# Patient Record
Sex: FEMALE
[De-identification: names, ages, dates, MRNs, and addresses within clinical notes are randomized per-mention and may not be internally consistent; named-entity substitution may affect disease eponyms.]

---

## 2017-11-12 ENCOUNTER — CHARTING TRANS (OUTPATIENT)
Dept: OTHER | Age: 37
End: 2017-11-12

## 2017-12-04 ENCOUNTER — HOSPITAL ENCOUNTER (OUTPATIENT)
Age: 37
Discharge: HOME | End: 2017-12-04
Attending: FAMILY MEDICINE
Payer: COMMERCIAL

## 2017-12-04 VITALS
HEART RATE: 88 BPM | RESPIRATION RATE: 22 BRPM | TEMPERATURE: 99 F | OXYGEN SATURATION: 98 % | SYSTOLIC BLOOD PRESSURE: 133 MMHG | DIASTOLIC BLOOD PRESSURE: 79 MMHG

## 2017-12-04 DIAGNOSIS — J98.01 ACUTE BRONCHOSPASM: Primary | ICD-10-CM

## 2017-12-04 PROCEDURE — 96374 THER/PROPH/DIAG INJ IV PUSH: CPT

## 2017-12-04 PROCEDURE — 99214 OFFICE O/P EST MOD 30 MIN: CPT

## 2017-12-04 PROCEDURE — 94640 AIRWAY INHALATION TREATMENT: CPT

## 2017-12-04 RX ORDER — METHYLPREDNISOLONE SODIUM SUCCINATE 125 MG/2ML
125 INJECTION, POWDER, LYOPHILIZED, FOR SOLUTION INTRAMUSCULAR; INTRAVENOUS ONCE
Status: COMPLETED | OUTPATIENT
Start: 2017-12-04 | End: 2017-12-04

## 2017-12-04 RX ORDER — IPRATROPIUM BROMIDE AND ALBUTEROL SULFATE 2.5; .5 MG/3ML; MG/3ML
3 SOLUTION RESPIRATORY (INHALATION) ONCE
Status: COMPLETED | OUTPATIENT
Start: 2017-12-04 | End: 2017-12-04

## 2017-12-04 RX ORDER — ALBUTEROL SULFATE 2.5 MG/3ML
2.5 SOLUTION RESPIRATORY (INHALATION) EVERY 4 HOURS PRN
Qty: 30 AMPULE | Refills: 0 | Status: SHIPPED | OUTPATIENT
Start: 2017-12-04 | End: 2018-01-03

## 2017-12-04 RX ORDER — ALBUTEROL SULFATE 90 UG/1
2 AEROSOL, METERED RESPIRATORY (INHALATION) EVERY 4 HOURS PRN
Qty: 1 INHALER | Refills: 0 | Status: SHIPPED | OUTPATIENT
Start: 2017-12-04 | End: 2018-01-03

## 2017-12-04 RX ORDER — PREDNISONE 10 MG/1
TABLET ORAL
Qty: 26 TABLET | Refills: 0 | Status: SHIPPED | OUTPATIENT
Start: 2017-12-05 | End: 2017-12-11

## 2017-12-04 NOTE — ED PROVIDER NOTES
Patient Seen in: [facility] In [facility] & [facility]    History   Patient presents with:  Dyspnea MILY SOB (respiratory)    Stated Complaint: wheezing / cough    HPI  70-year-old female coming in with complains of bronchospasm, wheezing, started last nig making good conversation, answering appropriately   SKIN: No pallor, no erythema, no cyanosis, warm and dry  Eyes: wnl, normal conjunctiva   HEAD: Normocephalic, atraumatic  EENT: OP - wnl, moist, Nares normal  NECK: FROM, supple  LUNGS: Expiratory wheeze 1451  ------------------------------------------------------------       MDM       Rx Prednisone taper to start tomorrow - you have already received Solumedrol here today   Rx Albuterol inhaler 2 puffs or Albuterol neb every 4-6 hours over the next 48 hour

## 2017-12-12 ENCOUNTER — OFFICE VISIT (OUTPATIENT)
Dept: ALLERGY | Facility: CLINIC | Age: 37
End: 2017-12-12

## 2017-12-12 ENCOUNTER — APPOINTMENT (OUTPATIENT)
Dept: LAB | Age: 37
End: 2017-12-12
Attending: ALLERGY & IMMUNOLOGY
Payer: COMMERCIAL

## 2017-12-12 ENCOUNTER — NURSE ONLY (OUTPATIENT)
Dept: ALLERGY | Facility: CLINIC | Age: 37
End: 2017-12-12

## 2017-12-12 ENCOUNTER — HOSPITAL ENCOUNTER (OUTPATIENT)
Dept: GENERAL RADIOLOGY | Age: 37
Discharge: HOME | End: 2017-12-12
Attending: ALLERGY & IMMUNOLOGY
Payer: COMMERCIAL

## 2017-12-12 VITALS
HEART RATE: 78 BPM | RESPIRATION RATE: 20 BRPM | BODY MASS INDEX: 31.54 KG/M2 | TEMPERATURE: 98 F | WEIGHT: 178 LBS | SYSTOLIC BLOOD PRESSURE: 120 MMHG | HEIGHT: 63 IN | OXYGEN SATURATION: 98 % | DIASTOLIC BLOOD PRESSURE: 82 MMHG

## 2017-12-12 DIAGNOSIS — Z91.018 FOOD ALLERGY: ICD-10-CM

## 2017-12-12 DIAGNOSIS — R06.2 WHEEZING: Primary | ICD-10-CM

## 2017-12-12 DIAGNOSIS — R07.89 CHEST TIGHTNESS OR PRESSURE: ICD-10-CM

## 2017-12-12 DIAGNOSIS — R06.2 WHEEZING: ICD-10-CM

## 2017-12-12 DIAGNOSIS — J30.2 CHRONIC SEASONAL ALLERGIC RHINITIS, UNSPECIFIED TRIGGER: ICD-10-CM

## 2017-12-12 PROCEDURE — 99204 OFFICE O/P NEW MOD 45 MIN: CPT | Performed by: ALLERGY & IMMUNOLOGY

## 2017-12-12 PROCEDURE — 86003 ALLG SPEC IGE CRUDE XTRC EA: CPT

## 2017-12-12 PROCEDURE — 94060 EVALUATION OF WHEEZING: CPT | Performed by: ALLERGY & IMMUNOLOGY

## 2017-12-12 PROCEDURE — 99212 OFFICE O/P EST SF 10 MIN: CPT | Performed by: ALLERGY & IMMUNOLOGY

## 2017-12-12 PROCEDURE — 95024 IQ TESTS W/ALLERGENIC XTRCS: CPT | Performed by: ALLERGY & IMMUNOLOGY

## 2017-12-12 PROCEDURE — 71020 XR CHEST PA + LAT CHEST (CPT=71020): CPT | Performed by: ALLERGY & IMMUNOLOGY

## 2017-12-12 PROCEDURE — 95004 PERQ TESTS W/ALRGNC XTRCS: CPT | Performed by: ALLERGY & IMMUNOLOGY

## 2017-12-12 PROCEDURE — 85379 FIBRIN DEGRADATION QUANT: CPT

## 2017-12-12 PROCEDURE — 36415 COLL VENOUS BLD VENIPUNCTURE: CPT

## 2017-12-12 RX ORDER — ATORVASTATIN CALCIUM 20 MG/1
TABLET, FILM COATED ORAL NIGHTLY
COMMUNITY
Start: 2017-11-01 | End: 2018-02-20 | Stop reason: SDUPTHER

## 2017-12-12 RX ORDER — NORETHINDRONE AND ETHINYL ESTRADIOL 1 MG-35MCG
1 KIT ORAL DAILY
COMMUNITY
Start: 2017-12-04 | End: 2018-02-20

## 2017-12-12 RX ORDER — EPINEPHRINE 0.3 MG/.3ML
INJECTION SUBCUTANEOUS
Qty: 1 EACH | Refills: 0 | Status: SHIPPED | OUTPATIENT
Start: 2017-12-12 | End: 2018-07-14

## 2017-12-12 RX ORDER — MOMETASONE 50 UG/1
2 SPRAY, METERED NASAL DAILY
Qty: 1 INHALER | Refills: 0 | Status: SHIPPED | OUTPATIENT
Start: 2017-12-12 | End: 2018-01-09

## 2017-12-12 RX ORDER — LEVOCETIRIZINE DIHYDROCHLORIDE 5 MG/1
5 TABLET, FILM COATED ORAL NIGHTLY
Qty: 30 TABLET | Refills: 0 | Status: SHIPPED | OUTPATIENT
Start: 2017-12-12 | End: 2018-01-09

## 2017-12-12 RX ORDER — CLONAZEPAM 0.5 MG/1
1 TABLET ORAL NIGHTLY PRN
COMMUNITY
Start: 2017-12-04

## 2017-12-12 RX ORDER — BUDESONIDE AND FORMOTEROL FUMARATE DIHYDRATE 160; 4.5 UG/1; UG/1
2 AEROSOL RESPIRATORY (INHALATION) 2 TIMES DAILY
Qty: 1 INHALER | Refills: 0 | Status: SHIPPED | OUTPATIENT
Start: 2017-12-12 | End: 2018-01-09

## 2017-12-12 NOTE — PROGRESS NOTES
Isha Koehler is a 40year old female.     HPI:   Patient presents with:  Dyspnea:  intermittent SOB and wheezing   Allergies    Patient is a 70-year-old female who presents for allergy consultation  Dr Cathy Morrow  with a chief complaint of allergies contributing to her symptoms  Nonsmoker, no ocps     Predominant symptom is still chest tightness. Patient denies GERD symptoms.   Denies burping or belching      HISTORY:  Past Medical History:   Diagnosis Date   • Arrhythmia    • CERVICAL DYSPLASIA 7/201 Multiple Vitamin (MULTI-VITAMIN OR) Take  by mouth.  Disp:  Rfl:    Citalopram Hydrobromide 40 MG Oral Tab 1 TABLET DAILY Disp:  Rfl:    Ascorbic Acid (VITAMIN C OR) None Entered Disp:  Rfl:    ibuprofen 200 MG Oral Tab Take 200 mg by mouth every 6 (six) bilaterally normal respiratory effort 98% RA  Cardiovascular: regular rate and rhythm no murmurs, gallups, or rubs  Abdomen: soft non-tender non-distended  Skin/Hair: no unusual rashes present  Extremities: no edema, cyanosis, or clubbing.   No leg pain, ca immunotherapy  Start xyzal   Add nasonex if refractory     3. Food allergy   Skin testing today to select foods including peanuts shrimp crab and lobster was negative.     Recs: Check serum IgE to shrimp crab and lobster per patient request to further evalu

## 2017-12-14 ENCOUNTER — TELEPHONE (OUTPATIENT)
Dept: ALLERGY | Facility: CLINIC | Age: 37
End: 2017-12-14

## 2017-12-14 NOTE — TELEPHONE ENCOUNTER
Pt called back, last name and  verified, and labs reviewed per Dr. Tessie Alarcon. Pt verbalized understanding, all questions answered and denied further questions at this time.  Pt stts that will call back after first of the year to schedule appt for oral challe

## 2017-12-14 NOTE — TELEPHONE ENCOUNTER
----- Message from Tony Dennis MD sent at 12/14/2017  1:39 PM CST -----  Please call patient with recent serum IgE testing to select foods including negative to peanut.   Patient did show sensitization to shrimp 0.18, crab 0.23 and lobster 0.46  Given

## 2017-12-14 NOTE — TELEPHONE ENCOUNTER
LM for Pt to call back. Informed pt we are only in office until 4pm today, and will be back Saturday morning. PSR please transfer call back to allergy.

## 2017-12-14 NOTE — TELEPHONE ENCOUNTER
Written by Farooq Sommers MD on 12/12/2017  2:55 PM   Mat Garcia, your d-dimer lab result is returned and was negative/normal.  This is good news.  This would make a blood clot an unlikely cause of your recent respiratory symptoms.    Regards, Dr. Tavo Ty

## 2017-12-28 ENCOUNTER — HOSPITAL ENCOUNTER (OUTPATIENT)
Age: 37
Discharge: HOME | End: 2017-12-28
Attending: FAMILY MEDICINE
Payer: COMMERCIAL

## 2017-12-28 VITALS
SYSTOLIC BLOOD PRESSURE: 137 MMHG | BODY MASS INDEX: 31.54 KG/M2 | WEIGHT: 178 LBS | TEMPERATURE: 98 F | HEART RATE: 94 BPM | DIASTOLIC BLOOD PRESSURE: 75 MMHG | OXYGEN SATURATION: 97 % | HEIGHT: 63 IN | RESPIRATION RATE: 20 BRPM

## 2017-12-28 DIAGNOSIS — J02.9 EXUDATIVE PHARYNGITIS: Primary | ICD-10-CM

## 2017-12-28 DIAGNOSIS — J06.9 VIRAL UPPER RESPIRATORY TRACT INFECTION: ICD-10-CM

## 2017-12-28 PROCEDURE — 87081 CULTURE SCREEN ONLY: CPT | Performed by: FAMILY MEDICINE

## 2017-12-28 PROCEDURE — 87430 STREP A AG IA: CPT | Performed by: FAMILY MEDICINE

## 2017-12-28 PROCEDURE — 99214 OFFICE O/P EST MOD 30 MIN: CPT

## 2017-12-28 RX ORDER — CODEINE PHOSPHATE AND GUAIFENESIN 10; 100 MG/5ML; MG/5ML
SOLUTION ORAL NIGHTLY PRN
Qty: 120 ML | Refills: 0 | Status: SHIPPED | OUTPATIENT
Start: 2017-12-28 | End: 2018-02-20 | Stop reason: ALTCHOICE

## 2017-12-28 RX ORDER — AMOXICILLIN 875 MG/1
875 TABLET, COATED ORAL 2 TIMES DAILY
Qty: 20 TABLET | Refills: 0 | Status: SHIPPED | OUTPATIENT
Start: 2017-12-28 | End: 2018-02-20 | Stop reason: ALTCHOICE

## 2017-12-28 NOTE — ED PROVIDER NOTES
Patient Seen in: [facility] In [facility] END    History   Patient presents with:  Sore Throat    Stated Complaint: 2 days fever,cough,raw throat,aches & pains    HPI    This 54-year-old female presents to the office with complaint of severe sore (5' 3\")   Wt 80.7 kg   LMP 12/03/2017 (Exact Date)   SpO2 97%   BMI 31.53 kg/m²         Physical Exam    General: WH/WN/WD, in no respiratory distress, complains of severe throat pain, A and O times 3  HEAD: Normocephalic, atraumatic  EYES: Sclera anicter pharyngitis    guaiFENesin-codeine (CHERATUSSIN AC) 100-10 MG/5ML Oral Solution  Take 5-10 mL by mouth nightly as needed for cough.   Qty: 120 mL Refills: 0  Associated Diagnoses:Viral upper respiratory tract infection           Take the Amoxil 875 mg 1 tab

## 2017-12-31 ENCOUNTER — APPOINTMENT (OUTPATIENT)
Dept: GENERAL RADIOLOGY | Age: 37
End: 2017-12-31
Attending: PHYSICIAN ASSISTANT
Payer: COMMERCIAL

## 2017-12-31 ENCOUNTER — HOSPITAL ENCOUNTER (OUTPATIENT)
Age: 37
Discharge: HOME | End: 2017-12-31
Payer: COMMERCIAL

## 2017-12-31 VITALS
TEMPERATURE: 98 F | HEIGHT: 63 IN | DIASTOLIC BLOOD PRESSURE: 81 MMHG | BODY MASS INDEX: 31.54 KG/M2 | SYSTOLIC BLOOD PRESSURE: 141 MMHG | WEIGHT: 178 LBS | RESPIRATION RATE: 20 BRPM | OXYGEN SATURATION: 97 % | HEART RATE: 80 BPM

## 2017-12-31 DIAGNOSIS — B34.9 VIRAL SYNDROME: Primary | ICD-10-CM

## 2017-12-31 PROCEDURE — 99214 OFFICE O/P EST MOD 30 MIN: CPT

## 2017-12-31 PROCEDURE — 99213 OFFICE O/P EST LOW 20 MIN: CPT

## 2017-12-31 PROCEDURE — 71020 XR CHEST PA + LAT CHEST (CPT=71020): CPT | Performed by: PHYSICIAN ASSISTANT

## 2017-12-31 RX ORDER — PREDNISONE 20 MG/1
40 TABLET ORAL DAILY
Qty: 10 TABLET | Refills: 0 | Status: SHIPPED | OUTPATIENT
Start: 2017-12-31 | End: 2018-01-05

## 2017-12-31 RX ORDER — CODEINE PHOSPHATE AND GUAIFENESIN 10; 100 MG/5ML; MG/5ML
5 SOLUTION ORAL 3 TIMES DAILY PRN
Qty: 60 ML | Refills: 0 | Status: SHIPPED | OUTPATIENT
Start: 2017-12-31 | End: 2018-01-04

## 2017-12-31 NOTE — ED INITIAL ASSESSMENT (HPI)
Was seen here on 12/28- treated for strep throat with Amoxicillin  Now- productive cough with yellow colored phlegm  Ear discomfort  Fatigue   Body aches  Intermittent fever  Diarrhea  States wheezing at home- using a nebulizer

## 2017-12-31 NOTE — ED PROVIDER NOTES
Patient Seen in: [facility] In [facility] & [facility]    History   Patient presents with:  Cough    Stated Complaint: deep cough, ear pain, chills, fever, x4    HPI    CHIEF COMPLAINT: Cough, congestion, fever, body aches, ear pain, sore throat    HISTOR date: OTHER SURGICAL HISTORY      Comment: Parotid Gland removal  2009: SINUS SURGERY        Comment: Deviated septum repair  1996: TONSILLECTOMY    Family history reviewed and is not pertinent to presenting problem.     Smoking status: Never Smoker agitation         ED Course   Labs Reviewed - No data to display  Xr Chest Pa + Lat Chest ([id number]=[id number])    Result Date: 12/31/2017  CONCLUSION:  Negative.     Dictated by: Елена Ybarra MD on 12/31/2017 at 14:29     Approved by: Елена Ybarra MD

## 2018-01-08 RX ORDER — LEVOCETIRIZINE DIHYDROCHLORIDE 5 MG/1
TABLET, FILM COATED ORAL
Qty: 30 TABLET | Refills: 0 | OUTPATIENT
Start: 2018-01-08

## 2018-01-08 NOTE — TELEPHONE ENCOUNTER
Refill requested for Levocetirizine Dihydrochloride Oral Tablet 5 MG  Will file in chart as: LEVOCETIRIZINE DIHYDROCHLORIDE 5 MG Oral Tab  TAKE 1 TABLET BY MOUTH IN THE EVENING        Disp: 30 tablet (Pharmacy requested 30)   Refills: 0     Class: Normal Start: 1/6/2018   Originally ordered: 3 weeks ago by Ro Meadows MD  Last refill: 12/12/2017    Last office visit: 12/12/2017     Previously advised to follow up in 3-4 weeks    F/U currently scheduled? 1/9/18     Date of last refill: 12/12/2017    Deny refill at this time. Pt will be seen by Dr. Krishna Núñez tomorrow in office. He will address any additional refills at that time. ACTION: Routed to Dr. Krishna Núñez for review.

## 2018-01-09 ENCOUNTER — OFFICE VISIT (OUTPATIENT)
Dept: ALLERGY | Facility: CLINIC | Age: 38
End: 2018-01-09

## 2018-01-09 VITALS
DIASTOLIC BLOOD PRESSURE: 58 MMHG | OXYGEN SATURATION: 98 % | RESPIRATION RATE: 18 BRPM | HEART RATE: 78 BPM | TEMPERATURE: 99 F | SYSTOLIC BLOOD PRESSURE: 124 MMHG

## 2018-01-09 DIAGNOSIS — Z91.09 ENVIRONMENTAL ALLERGIES: ICD-10-CM

## 2018-01-09 DIAGNOSIS — Z91.018 FOOD ALLERGY: ICD-10-CM

## 2018-01-09 DIAGNOSIS — R07.89 CHEST TIGHTNESS OR PRESSURE: ICD-10-CM

## 2018-01-09 DIAGNOSIS — R06.2 WHEEZING: Primary | ICD-10-CM

## 2018-01-09 DIAGNOSIS — H61.22 IMPACTED CERUMEN OF LEFT EAR: ICD-10-CM

## 2018-01-09 PROCEDURE — 90732 PPSV23 VACC 2 YRS+ SUBQ/IM: CPT | Performed by: ALLERGY & IMMUNOLOGY

## 2018-01-09 PROCEDURE — 99214 OFFICE O/P EST MOD 30 MIN: CPT | Performed by: ALLERGY & IMMUNOLOGY

## 2018-01-09 PROCEDURE — 94010 BREATHING CAPACITY TEST: CPT | Performed by: ALLERGY & IMMUNOLOGY

## 2018-01-09 PROCEDURE — 99212 OFFICE O/P EST SF 10 MIN: CPT | Performed by: ALLERGY & IMMUNOLOGY

## 2018-01-09 PROCEDURE — 90471 IMMUNIZATION ADMIN: CPT | Performed by: ALLERGY & IMMUNOLOGY

## 2018-01-09 RX ORDER — BUDESONIDE AND FORMOTEROL FUMARATE DIHYDRATE 80; 4.5 UG/1; UG/1
2 AEROSOL RESPIRATORY (INHALATION) 2 TIMES DAILY
Qty: 3 INHALER | Refills: 1 | Status: SHIPPED | OUTPATIENT
Start: 2018-01-09 | End: 2018-06-12 | Stop reason: DRUGHIGH

## 2018-01-09 RX ORDER — LEVOCETIRIZINE DIHYDROCHLORIDE 5 MG/1
5 TABLET, FILM COATED ORAL NIGHTLY
Qty: 90 TABLET | Refills: 1 | Status: SHIPPED | OUTPATIENT
Start: 2018-01-09 | End: 2018-07-10

## 2018-01-09 RX ORDER — ALBUTEROL SULFATE 2.5 MG/3ML
2.5 SOLUTION RESPIRATORY (INHALATION) EVERY 4 HOURS PRN
Qty: 1 BOX | Refills: 1 | Status: SHIPPED | OUTPATIENT
Start: 2018-01-09 | End: 2018-07-03

## 2018-01-09 RX ORDER — MOMETASONE 50 UG/1
2 SPRAY, METERED NASAL DAILY
Qty: 3 INHALER | Refills: 1 | Status: SHIPPED | OUTPATIENT
Start: 2018-01-09 | End: 2018-06-09

## 2018-01-09 NOTE — PROGRESS NOTES
Tavo Suarez is a 40year old female. HPI:   Patient presents with: Other: last week pt had strep and the flu. so she is recovering from that. Allergies: Symbicort, xyzal, nasal spray has helped tremdously.  No cats in the bedroom 90% of the 7/2011   • Depression    • Heart murmur    • Other and unspecified hyperlipidemia    • Parotid swelling       Past Surgical History:  1996: ORAL SURGERY PROCEDURE      Comment: wisdom teeth removed  No date: OTHER      Comment: parotid gland removed in Dec DiphenhydrAMINE HCl (BENADRYL ALLERGY OR) Take  by mouth. Disp:  Rfl:    Multiple Vitamin (MULTI-VITAMIN OR) Take  by mouth.  Disp:  Rfl:    Citalopram Hydrobromide 40 MG Oral Tab 1 TABLET DAILY Disp:  Rfl:    Ascorbic Acid (VITAMIN C OR) None Entered Dis no unusual rashes present   Extremities: no edema, cyanosis, or clubbing     ASSESSMENT/PLAN:   Assessment   Wheezing  (primary encounter diagnosis)  Chest tightness or pressure  Food allergy  Environmental allergies    1.  Wheeze/chest tightness  Resolved

## 2018-02-19 PROBLEM — K76.0 FATTY LIVER: Status: ACTIVE | Noted: 2018-02-19

## 2018-02-20 PROBLEM — J45.20 ASTHMA, ALLERGIC, MILD INTERMITTENT, UNCOMPLICATED: Status: ACTIVE | Noted: 2018-02-20

## 2018-02-20 PROBLEM — K64.9 HEMORRHOIDS, UNSPECIFIED HEMORRHOID TYPE: Status: ACTIVE | Noted: 2018-02-20

## 2018-02-20 PROBLEM — Z91.09 ENVIRONMENTAL ALLERGIES: Status: ACTIVE | Noted: 2018-02-20

## 2018-02-20 PROBLEM — Z91.013 SHELLFISH ALLERGY: Status: ACTIVE | Noted: 2018-02-20

## 2018-02-20 PROCEDURE — 88175 CYTOPATH C/V AUTO FLUID REDO: CPT | Performed by: FAMILY MEDICINE

## 2018-02-20 PROCEDURE — 87591 N.GONORRHOEAE DNA AMP PROB: CPT | Performed by: FAMILY MEDICINE

## 2018-02-20 PROCEDURE — 87491 CHLMYD TRACH DNA AMP PROBE: CPT | Performed by: FAMILY MEDICINE

## 2018-02-24 ENCOUNTER — APPOINTMENT (OUTPATIENT)
Dept: GENERAL RADIOLOGY | Facility: HOSPITAL | Age: 38
End: 2018-02-24
Attending: EMERGENCY MEDICINE
Payer: COMMERCIAL

## 2018-02-24 ENCOUNTER — HOSPITAL ENCOUNTER (EMERGENCY)
Facility: HOSPITAL | Age: 38
Discharge: HOME | End: 2018-02-24
Attending: EMERGENCY MEDICINE
Payer: COMMERCIAL

## 2018-02-24 VITALS — SYSTOLIC BLOOD PRESSURE: 125 MMHG | HEART RATE: 75 BPM | OXYGEN SATURATION: 100 % | DIASTOLIC BLOOD PRESSURE: 80 MMHG

## 2018-02-24 DIAGNOSIS — M54.6 ACUTE LEFT-SIDED THORACIC BACK PAIN: Primary | ICD-10-CM

## 2018-02-24 LAB
ALBUMIN SERPL-MCNC: 4 G/DL (ref 3.5–4.8)
ALP LIVER SERPL-CCNC: 34 U/L (ref 37–98)
ALT SERPL-CCNC: 21 U/L (ref 14–54)
AST SERPL-CCNC: 15 U/L (ref 15–41)
BASOPHILS # BLD AUTO: 0.07 X10(3) UL (ref 0–0.1)
BASOPHILS NFR BLD AUTO: 1 %
BILIRUB SERPL-MCNC: 0.6 MG/DL (ref 0.1–2)
BILIRUB UR QL STRIP.AUTO: NEGATIVE
BUN BLD-MCNC: 12 MG/DL (ref 8–20)
CALCIUM BLD-MCNC: 9.2 MG/DL (ref 8.3–10.3)
CHLORIDE: 106 MMOL/L (ref 101–111)
CLARITY UR REFRACT.AUTO: CLEAR
CO2: 26 MMOL/L (ref 22–32)
COLOR UR AUTO: YELLOW
CREAT BLD-MCNC: 0.67 MG/DL (ref 0.55–1.02)
D-DIMER: <0.27 UG/ML FEU (ref 0–0.49)
EOSINOPHIL # BLD AUTO: 0.39 X10(3) UL (ref 0–0.3)
EOSINOPHIL NFR BLD AUTO: 5.7 %
ERYTHROCYTE [DISTWIDTH] IN BLOOD BY AUTOMATED COUNT: 13.3 % (ref 11.5–16)
GLUCOSE BLD-MCNC: 100 MG/DL (ref 70–99)
GLUCOSE UR STRIP.AUTO-MCNC: NEGATIVE MG/DL
HCT VFR BLD AUTO: 36.2 % (ref 34–50)
HGB BLD-MCNC: 11.9 G/DL (ref 12–16)
IMMATURE GRANULOCYTE COUNT: 0.02 X10(3) UL (ref 0–1)
IMMATURE GRANULOCYTE RATIO %: 0.3 %
KETONES UR STRIP.AUTO-MCNC: NEGATIVE MG/DL
LEUKOCYTE ESTERASE UR QL STRIP.AUTO: NEGATIVE
LYMPHOCYTES # BLD AUTO: 2.7 X10(3) UL (ref 0.9–4)
LYMPHOCYTES NFR BLD AUTO: 39.1 %
M PROTEIN MFR SERPL ELPH: 7.3 G/DL (ref 6.1–8.3)
MCH RBC QN AUTO: 28.3 PG (ref 27–33.2)
MCHC RBC AUTO-ENTMCNC: 32.9 G/DL (ref 31–37)
MCV RBC AUTO: 86 FL (ref 81–100)
MONOCYTES # BLD AUTO: 0.55 X10(3) UL (ref 0.1–1)
MONOCYTES NFR BLD AUTO: 8 %
NEUTROPHIL ABS PRELIM: 3.17 X10 (3) UL (ref 1.3–6.7)
NEUTROPHILS # BLD AUTO: 3.17 X10(3) UL (ref 1.3–6.7)
NEUTROPHILS NFR BLD AUTO: 45.9 %
NITRITE UR QL STRIP.AUTO: NEGATIVE
PH UR STRIP.AUTO: 6 [PH] (ref 4.5–8)
PLATELET # BLD AUTO: 195 10(3)UL (ref 150–450)
POCT URINE PREGNANCY: NEGATIVE
POTASSIUM SERPL-SCNC: 3.8 MMOL/L (ref 3.6–5.1)
PROT UR STRIP.AUTO-MCNC: NEGATIVE MG/DL
RBC # BLD AUTO: 4.21 X10(6)UL (ref 3.8–5.1)
RBC UR QL AUTO: NEGATIVE
RED CELL DISTRIBUTION WIDTH-SD: 41.3 FL (ref 35.1–46.3)
SODIUM SERPL-SCNC: 140 MMOL/L (ref 136–144)
SP GR UR STRIP.AUTO: 1.01 (ref 1–1.03)
UROBILINOGEN UR STRIP.AUTO-MCNC: <2 MG/DL
WBC # BLD AUTO: 6.9 X10(3) UL (ref 4–13)

## 2018-02-24 PROCEDURE — 81025 URINE PREGNANCY TEST: CPT

## 2018-02-24 PROCEDURE — 80053 COMPREHEN METABOLIC PANEL: CPT | Performed by: EMERGENCY MEDICINE

## 2018-02-24 PROCEDURE — 85025 COMPLETE CBC W/AUTO DIFF WBC: CPT | Performed by: EMERGENCY MEDICINE

## 2018-02-24 PROCEDURE — 99284 EMERGENCY DEPT VISIT MOD MDM: CPT

## 2018-02-24 PROCEDURE — 71046 X-RAY EXAM CHEST 2 VIEWS: CPT | Performed by: EMERGENCY MEDICINE

## 2018-02-24 PROCEDURE — 72100 X-RAY EXAM L-S SPINE 2/3 VWS: CPT | Performed by: EMERGENCY MEDICINE

## 2018-02-24 PROCEDURE — 85378 FIBRIN DEGRADE SEMIQUANT: CPT | Performed by: EMERGENCY MEDICINE

## 2018-02-24 PROCEDURE — 96374 THER/PROPH/DIAG INJ IV PUSH: CPT

## 2018-02-24 PROCEDURE — 81003 URINALYSIS AUTO W/O SCOPE: CPT | Performed by: EMERGENCY MEDICINE

## 2018-02-24 PROCEDURE — 96361 HYDRATE IV INFUSION ADD-ON: CPT

## 2018-02-24 RX ORDER — SODIUM CHLORIDE 9 MG/ML
INJECTION, SOLUTION INTRAVENOUS ONCE
Status: COMPLETED | OUTPATIENT
Start: 2018-02-24 | End: 2018-02-24

## 2018-02-24 RX ORDER — IBUPROFEN 600 MG/1
600 TABLET ORAL EVERY 8 HOURS PRN
Qty: 20 TABLET | Refills: 0 | Status: SHIPPED | OUTPATIENT
Start: 2018-02-24 | End: 2018-07-03

## 2018-02-24 RX ORDER — CYCLOBENZAPRINE HCL 10 MG
10 TABLET ORAL 3 TIMES DAILY PRN
Qty: 21 TABLET | Refills: 0 | Status: SHIPPED | OUTPATIENT
Start: 2018-02-24 | End: 2018-03-03

## 2018-02-24 RX ORDER — KETOROLAC TROMETHAMINE 30 MG/ML
30 INJECTION, SOLUTION INTRAMUSCULAR; INTRAVENOUS ONCE
Status: COMPLETED | OUTPATIENT
Start: 2018-02-24 | End: 2018-02-24

## 2018-02-24 NOTE — ED NOTES
Pt back in room from XR - updated on POC. Pt reports that toradol took the edge off of her pain and at this moment she is okay, but will probably need something in a little bit. Pt updated to let RN know when she would like something for pain.  Pt has no ot

## 2018-04-08 ENCOUNTER — APPOINTMENT (OUTPATIENT)
Dept: ULTRASOUND IMAGING | Age: 38
End: 2018-04-08
Attending: PHYSICIAN ASSISTANT
Payer: COMMERCIAL

## 2018-04-08 ENCOUNTER — HOSPITAL ENCOUNTER (OUTPATIENT)
Age: 38
Discharge: HOME | End: 2018-04-08
Payer: COMMERCIAL

## 2018-04-08 VITALS — WEIGHT: 176 LBS | HEIGHT: 63 IN | BODY MASS INDEX: 31.18 KG/M2

## 2018-04-08 DIAGNOSIS — R11.2 NAUSEA VOMITING AND DIARRHEA: Primary | ICD-10-CM

## 2018-04-08 DIAGNOSIS — R19.7 NAUSEA VOMITING AND DIARRHEA: Primary | ICD-10-CM

## 2018-04-08 DIAGNOSIS — R10.84 ABDOMINAL PAIN, GENERALIZED: ICD-10-CM

## 2018-04-08 PROCEDURE — 85025 COMPLETE CBC W/AUTO DIFF WBC: CPT | Performed by: PHYSICIAN ASSISTANT

## 2018-04-08 PROCEDURE — 96361 HYDRATE IV INFUSION ADD-ON: CPT

## 2018-04-08 PROCEDURE — 99214 OFFICE O/P EST MOD 30 MIN: CPT

## 2018-04-08 PROCEDURE — 80047 BASIC METABLC PNL IONIZED CA: CPT

## 2018-04-08 PROCEDURE — 96374 THER/PROPH/DIAG INJ IV PUSH: CPT

## 2018-04-08 PROCEDURE — 96375 TX/PRO/DX INJ NEW DRUG ADDON: CPT

## 2018-04-08 PROCEDURE — 80076 HEPATIC FUNCTION PANEL: CPT | Performed by: PHYSICIAN ASSISTANT

## 2018-04-08 PROCEDURE — 81002 URINALYSIS NONAUTO W/O SCOPE: CPT | Performed by: PHYSICIAN ASSISTANT

## 2018-04-08 PROCEDURE — 76700 US EXAM ABDOM COMPLETE: CPT | Performed by: PHYSICIAN ASSISTANT

## 2018-04-08 RX ORDER — ONDANSETRON 8 MG/1
8 TABLET, ORALLY DISINTEGRATING ORAL EVERY 8 HOURS PRN
Qty: 10 TABLET | Refills: 0 | Status: SHIPPED | OUTPATIENT
Start: 2018-04-08 | End: 2018-04-15

## 2018-04-08 RX ORDER — ONDANSETRON 8 MG/1
8 TABLET, ORALLY DISINTEGRATING ORAL EVERY 8 HOURS PRN
Qty: 10 TABLET | Refills: 0 | Status: SHIPPED | OUTPATIENT
Start: 2018-04-08 | End: 2018-04-08 | Stop reason: CLARIF

## 2018-04-08 RX ORDER — ONDANSETRON 2 MG/ML
4 INJECTION INTRAMUSCULAR; INTRAVENOUS ONCE
Status: COMPLETED | OUTPATIENT
Start: 2018-04-08 | End: 2018-04-08

## 2018-04-08 RX ORDER — KETOROLAC TROMETHAMINE 30 MG/ML
30 INJECTION, SOLUTION INTRAMUSCULAR; INTRAVENOUS ONCE
Status: COMPLETED | OUTPATIENT
Start: 2018-04-08 | End: 2018-04-08

## 2018-04-08 RX ORDER — SODIUM CHLORIDE 9 MG/ML
1000 INJECTION, SOLUTION INTRAVENOUS ONCE
Status: COMPLETED | OUTPATIENT
Start: 2018-04-08 | End: 2018-04-08

## 2018-04-08 NOTE — ED PROVIDER NOTES
Patient Seen in: [facility] In [facility] & [facility]    History   Patient presents with:  Nausea/Vomiting/Diarrhea (gastrointestinal)    Stated Complaint: abdominal pain, vomiting, fi4ftcs    HPI    CHIEF COMPLAINT: Diffuse abdominal pain, nausea, vomit Recurrent sinusitis    • Seasonal allergies        Past Surgical History:  12/2016: HX PAROTIDECTOMY      Comment: parotid gland removed in December 2016 1996: ORAL SURGERY PROCEDURE      Comment: wisdom teeth removed  2009: SINUS SURGERY        Comment: rashes. No jaundice. Brisk capillary refill  Musculoskeletal: neck is supple, no lymphadenopathy, non tender. no meningeal signs.        Extremities are symmetrical, full range of motion  Psychiatric: there is no agitation       ED Course     Labs Reviewed Course as of Apr 11 1456  ------------------------------------------------------------       [facility]     Patient symptoms are consistent with a viral gastroenteritis and patient will be discharged home with Zofran, bland diet instructions, increase fluids and

## 2018-04-08 NOTE — ED INITIAL ASSESSMENT (HPI)
Pt c/o vomiting and diarrhea since Friday. Accompanied epigastric pain. States took Immodium and diarrhea is now gone. Today still feels nauseated and is having dry heaves. Fever x 1-2 days. +exposure to stomach flu.

## 2018-06-04 ENCOUNTER — TELEPHONE (OUTPATIENT)
Dept: ALLERGY | Facility: CLINIC | Age: 38
End: 2018-06-04

## 2018-06-04 NOTE — TELEPHONE ENCOUNTER
Call reviewed and noted. Agree with urgent care evaluation at this time.   We can keep her in mind for Wednesday, June 6 if there is a cancellation unfortunately otherwise the schedule is completely full

## 2018-06-04 NOTE — TELEPHONE ENCOUNTER
Pt was last seen in Allergy for Wheezing. Symptoms? Pt c/o dry cough, tightness in throat that began when awaking this morning 6/4/2018. Wheeze?  Pt denies that she is experiencing wheeze at this time, pt spoken with and is able to speak in complet

## 2018-06-05 NOTE — TELEPHONE ENCOUNTER
LMTCB on pt's voice mail, asked pt to call back and give update on shortness of breath experienced 6/4/2018.

## 2018-06-06 NOTE — TELEPHONE ENCOUNTER
Pt reports she is doing a lot better today. Today pt reports she has been using nebulizer BID-TID. Seems to be helping. Symbicort BID. She the shortness of breath is gone.  Pt is scheduled to see Dr. Deangelo Solis on June 26, 2018 to follow up prior to moving

## 2018-06-06 NOTE — TELEPHONE ENCOUNTER
Prescription for nebulizer sent to [facility] as requested

## 2018-06-08 ENCOUNTER — TELEPHONE (OUTPATIENT)
Dept: ALLERGY | Facility: CLINIC | Age: 38
End: 2018-06-08

## 2018-06-08 NOTE — TELEPHONE ENCOUNTER
PT stts she not breathing well and using her nebulizer 3 times a day. Since Dr, Doc Kawasaki is not here I advised to call her PCP.  Please advise

## 2018-06-09 ENCOUNTER — APPOINTMENT (OUTPATIENT)
Dept: GENERAL RADIOLOGY | Age: 38
End: 2018-06-09
Attending: PHYSICIAN ASSISTANT
Payer: COMMERCIAL

## 2018-06-09 ENCOUNTER — HOSPITAL ENCOUNTER (OUTPATIENT)
Age: 38
Discharge: HOME | End: 2018-06-09
Payer: COMMERCIAL

## 2018-06-09 VITALS
HEART RATE: 75 BPM | SYSTOLIC BLOOD PRESSURE: 129 MMHG | OXYGEN SATURATION: 99 % | TEMPERATURE: 97 F | DIASTOLIC BLOOD PRESSURE: 79 MMHG | RESPIRATION RATE: 18 BRPM

## 2018-06-09 DIAGNOSIS — J45.41 MODERATE PERSISTENT ASTHMA WITH EXACERBATION: Primary | ICD-10-CM

## 2018-06-09 PROCEDURE — 99214 OFFICE O/P EST MOD 30 MIN: CPT

## 2018-06-09 PROCEDURE — 94640 AIRWAY INHALATION TREATMENT: CPT

## 2018-06-09 PROCEDURE — 87081 CULTURE SCREEN ONLY: CPT | Performed by: PHYSICIAN ASSISTANT

## 2018-06-09 PROCEDURE — 87430 STREP A AG IA: CPT | Performed by: PHYSICIAN ASSISTANT

## 2018-06-09 PROCEDURE — 71046 X-RAY EXAM CHEST 2 VIEWS: CPT | Performed by: PHYSICIAN ASSISTANT

## 2018-06-09 RX ORDER — PREDNISONE 20 MG/1
40 TABLET ORAL DAILY
Qty: 10 TABLET | Refills: 0 | Status: SHIPPED | OUTPATIENT
Start: 2018-06-09 | End: 2018-06-14

## 2018-06-09 RX ORDER — IPRATROPIUM BROMIDE AND ALBUTEROL SULFATE 2.5; .5 MG/3ML; MG/3ML
3 SOLUTION RESPIRATORY (INHALATION) ONCE
Status: COMPLETED | OUTPATIENT
Start: 2018-06-09 | End: 2018-06-09

## 2018-06-09 NOTE — ED INITIAL ASSESSMENT (HPI)
c/o difficulty breathing for 7 days, the past 2 days used Albuterol nebulizer and inhaler more frequently. Dry cough for 3 days. Denies fever. States \"lungs tired\"  Peak flow this morning is only up to 150.

## 2018-06-09 NOTE — TELEPHONE ENCOUNTER
Pt returned telephone call, c/o shortness of breath, labored breathing. Pt is able to speak in complete sentences. Pt with hx of Asthma. Notes that asthma has been flaring the last 6 days since 6/3/2018. Pt notes wheeze and nonproductive cough.  Pt renetta

## 2018-06-11 NOTE — TELEPHONE ENCOUNTER
Pt contacted, notes that she is not experiencing dyspnea after being seen in UR Saturday 6/9/2018.   Pt notes that she is taking Prednisone, has a f/u appt with her PCP for the dyspnea tomorrow 6/12/2018, has f/u appt with Dr. Abby Vega 6/26/2018

## 2018-06-13 ENCOUNTER — CHARTING TRANS (OUTPATIENT)
Dept: OTHER | Age: 38
End: 2018-06-13

## 2018-06-13 ENCOUNTER — LAB SERVICES (OUTPATIENT)
Dept: OTHER | Age: 38
End: 2018-06-13

## 2018-06-13 LAB — RAPID STREP GROUP A: NORMAL

## 2018-06-26 ENCOUNTER — OFFICE VISIT (OUTPATIENT)
Dept: ALLERGY | Facility: CLINIC | Age: 38
End: 2018-06-26

## 2018-06-26 DIAGNOSIS — J45.40 MODERATE PERSISTENT EXTRINSIC ASTHMA WITHOUT COMPLICATION: Primary | ICD-10-CM

## 2018-06-26 DIAGNOSIS — Z91.018 FOOD ALLERGY: ICD-10-CM

## 2018-06-26 DIAGNOSIS — T63.441A BEE STING REACTION, ACCIDENTAL OR UNINTENTIONAL, INITIAL ENCOUNTER: ICD-10-CM

## 2018-06-26 DIAGNOSIS — Z91.09 ENVIRONMENTAL ALLERGIES: ICD-10-CM

## 2018-06-26 DIAGNOSIS — J45.21 MILD INTERMITTENT ASTHMA WITH EXACERBATION: ICD-10-CM

## 2018-06-26 PROCEDURE — 99214 OFFICE O/P EST MOD 30 MIN: CPT | Performed by: ALLERGY & IMMUNOLOGY

## 2018-06-26 PROCEDURE — 99212 OFFICE O/P EST SF 10 MIN: CPT | Performed by: ALLERGY & IMMUNOLOGY

## 2018-06-26 PROCEDURE — 94010 BREATHING CAPACITY TEST: CPT | Performed by: ALLERGY & IMMUNOLOGY

## 2018-06-26 RX ORDER — BUDESONIDE AND FORMOTEROL FUMARATE DIHYDRATE 160; 4.5 UG/1; UG/1
2 AEROSOL RESPIRATORY (INHALATION) 2 TIMES DAILY
Qty: 1 INHALER | Refills: 5 | Status: SHIPPED | OUTPATIENT
Start: 2018-06-26 | End: 2018-11-28

## 2018-06-26 NOTE — PROGRESS NOTES
Jessica Beckman is a 40year old female. HPI:   Patient presents with:  Asthma: Pt with a recent asthma flare up. High pollen count and humid weather, pt reports she had to use the nebulizer BID-TID.  Went to PCP since she couldn't see Dr. Orlando ramirez increased to 160/4.5 and cont singulair   Back to baseline per pt     No prior ait    Moving to [place] in September or  Oct     On xyzal, singulair, symbicort     Pt reports 8 years ago she got stung by 25 or so bees.  She is concerned she may be allergic to be Disp: 30 tablet Rfl: 12   Fenofibrate 145 MG Oral Tab Take 1 tablet (145 mg total) by mouth daily. Disp: 30 tablet Rfl: 11   Norethindrone-Eth Estradiol (ALYACEN 1/35) 1-35 MG-MCG Oral Tab Take 1 tablet by mouth daily.  Disp: 1 Package Rfl: 12   Montelukast drainage  Eyes:  Negative for eye discharge and vision loss  Gastrointestinal:  Negative for abdominal pain, diarrhea and vomiting  Integumentary:  Negative for pruritus and rash  Respiratory:  Negative for cough, dyspnea and wheezing    PHYSICAL EXAM:   C dogs  Continue with Singulair Xyzal  Add Flonase or Nasacort if refractory    3. Hymenoptera reaction  Check serum IgE profile to be/yellow jackets hornets.   Will call with results  Reviewed EpiPen teaching  Patient to have prescription filled         Lorena Felipe

## 2018-06-30 ENCOUNTER — HOSPITAL ENCOUNTER (OUTPATIENT)
Age: 38
Discharge: HOME | End: 2018-06-30
Attending: FAMILY MEDICINE
Payer: COMMERCIAL

## 2018-06-30 ENCOUNTER — APPOINTMENT (OUTPATIENT)
Dept: GENERAL RADIOLOGY | Age: 38
End: 2018-06-30
Attending: FAMILY MEDICINE
Payer: COMMERCIAL

## 2018-06-30 VITALS
TEMPERATURE: 98 F | WEIGHT: 175 LBS | DIASTOLIC BLOOD PRESSURE: 70 MMHG | HEART RATE: 94 BPM | RESPIRATION RATE: 22 BRPM | OXYGEN SATURATION: 96 % | BODY MASS INDEX: 31 KG/M2 | SYSTOLIC BLOOD PRESSURE: 139 MMHG

## 2018-06-30 DIAGNOSIS — J45.41 MODERATE PERSISTENT ASTHMA WITH EXACERBATION: Primary | ICD-10-CM

## 2018-06-30 PROCEDURE — 71046 X-RAY EXAM CHEST 2 VIEWS: CPT | Performed by: FAMILY MEDICINE

## 2018-06-30 PROCEDURE — 99214 OFFICE O/P EST MOD 30 MIN: CPT

## 2018-06-30 PROCEDURE — 94640 AIRWAY INHALATION TREATMENT: CPT

## 2018-06-30 PROCEDURE — 96374 THER/PROPH/DIAG INJ IV PUSH: CPT

## 2018-06-30 RX ORDER — IPRATROPIUM BROMIDE AND ALBUTEROL SULFATE 2.5; .5 MG/3ML; MG/3ML
3 SOLUTION RESPIRATORY (INHALATION) ONCE
Status: COMPLETED | OUTPATIENT
Start: 2018-06-30 | End: 2018-06-30

## 2018-06-30 RX ORDER — ALBUTEROL SULFATE 2.5 MG/3ML
2.5 SOLUTION RESPIRATORY (INHALATION) EVERY 4 HOURS PRN
Qty: 30 AMPULE | Refills: 0 | Status: SHIPPED | OUTPATIENT
Start: 2018-06-30

## 2018-06-30 RX ORDER — METHYLPREDNISOLONE SODIUM SUCCINATE 125 MG/2ML
125 INJECTION, POWDER, LYOPHILIZED, FOR SOLUTION INTRAMUSCULAR; INTRAVENOUS ONCE
Status: COMPLETED | OUTPATIENT
Start: 2018-06-30 | End: 2018-06-30

## 2018-06-30 RX ORDER — ALBUTEROL SULFATE 2.5 MG/3ML
2.5 SOLUTION RESPIRATORY (INHALATION) ONCE
Status: COMPLETED | OUTPATIENT
Start: 2018-06-30 | End: 2018-06-30

## 2018-06-30 RX ORDER — PREDNISONE 10 MG/1
TABLET ORAL
Qty: 20 TABLET | Refills: 0 | Status: SHIPPED | OUTPATIENT
Start: 2018-06-30 | End: 2018-07-03

## 2018-06-30 NOTE — ED PROVIDER NOTES
Patient Seen in: [facility] In [facility]    History   Patient presents with:  Dyspnea MILY SOB (respiratory)    Stated Complaint: Asthma, shortnesss of breath    HPI  77-year-old female with a history of asthma presents to the immediate c socially      Review of Systems    Positive for stated complaint: Asthma, shortnesss of breath  Other systems are as noted in HPI. Constitutional and vital signs reviewed. All other systems reviewed and negative except as noted above.     Physical Exa of asthma. FINDINGS:  Normal heart size and pulmonary vascularity. No pleural effusion or pneumothorax. No lobar consolidation. Degenerative changes in the spine. CONCLUSION:  No active cardiopulmonary process identified.      Dictated by: Valorie Vazquez with exacerbation  (primary encounter diagnosis)    Disposition:  Discharge  6/30/2018  2:41 pm    Follow-up:  Jenny Meneses, [facility] [place] [id number] [phone]    In 2 days  For re-check        Medications Prescribed:  Curr

## 2018-07-03 ENCOUNTER — APPOINTMENT (OUTPATIENT)
Dept: GENERAL RADIOLOGY | Facility: HOSPITAL | Age: 38
DRG: 202 | End: 2018-07-03
Attending: EMERGENCY MEDICINE
Payer: COMMERCIAL

## 2018-07-03 ENCOUNTER — HOSPITAL ENCOUNTER (INPATIENT)
Facility: HOSPITAL | Age: 38
LOS: 3 days | Discharge: HOME | DRG: 202 | End: 2018-07-06
Attending: EMERGENCY MEDICINE | Admitting: HOSPITALIST
Payer: COMMERCIAL

## 2018-07-03 ENCOUNTER — HOSPITAL ENCOUNTER (OUTPATIENT)
Age: 38
Discharge: ANOTHER HOSPITAL | End: 2018-07-03
Payer: COMMERCIAL

## 2018-07-03 VITALS
SYSTOLIC BLOOD PRESSURE: 134 MMHG | RESPIRATION RATE: 26 BRPM | OXYGEN SATURATION: 98 % | TEMPERATURE: 98 F | DIASTOLIC BLOOD PRESSURE: 72 MMHG | HEART RATE: 78 BPM

## 2018-07-03 DIAGNOSIS — R06.1 STRIDOR: ICD-10-CM

## 2018-07-03 DIAGNOSIS — J45.41 MODERATE PERSISTENT ASTHMA WITH EXACERBATION: Primary | ICD-10-CM

## 2018-07-03 DIAGNOSIS — D64.9 ANEMIA, UNSPECIFIED TYPE: ICD-10-CM

## 2018-07-03 DIAGNOSIS — J45.51 SEVERE PERSISTENT ASTHMA WITH EXACERBATION: Primary | ICD-10-CM

## 2018-07-03 LAB
ALBUMIN SERPL-MCNC: 4.3 G/DL (ref 3.5–4.8)
ALP LIVER SERPL-CCNC: 24 U/L (ref 37–98)
ALT SERPL-CCNC: 29 U/L (ref 14–54)
AST SERPL-CCNC: 41 U/L (ref 15–41)
BASOPHILS # BLD AUTO: 0.03 X10(3) UL (ref 0–0.1)
BASOPHILS NFR BLD AUTO: 0.3 %
BILIRUB SERPL-MCNC: 0.4 MG/DL (ref 0.1–2)
BUN BLD-MCNC: 25 MG/DL (ref 8–20)
CALCIUM BLD-MCNC: 10.2 MG/DL (ref 8.3–10.3)
CHLORIDE: 106 MMOL/L (ref 101–111)
CO2: 21 MMOL/L (ref 22–32)
CREAT BLD-MCNC: 1.18 MG/DL (ref 0.55–1.02)
EOSINOPHIL # BLD AUTO: 0.03 X10(3) UL (ref 0–0.3)
EOSINOPHIL NFR BLD AUTO: 0.3 %
ERYTHROCYTE [DISTWIDTH] IN BLOOD BY AUTOMATED COUNT: 14 % (ref 11.5–16)
GLUCOSE BLD-MCNC: 139 MG/DL (ref 70–99)
HCT VFR BLD AUTO: 28.8 % (ref 34–50)
HGB BLD-MCNC: 9.1 G/DL (ref 12–16)
IMMATURE GRANULOCYTE COUNT: 0.11 X10(3) UL (ref 0–1)
IMMATURE GRANULOCYTE RATIO %: 1 %
LYMPHOCYTES # BLD AUTO: 2.95 X10(3) UL (ref 0.9–4)
LYMPHOCYTES NFR BLD AUTO: 26.9 %
M PROTEIN MFR SERPL ELPH: 7.8 G/DL (ref 6.1–8.3)
MCH RBC QN AUTO: 27.5 PG (ref 27–33.2)
MCHC RBC AUTO-ENTMCNC: 31.6 G/DL (ref 31–37)
MCV RBC AUTO: 87 FL (ref 81–100)
MONOCYTES # BLD AUTO: 0.46 X10(3) UL (ref 0.1–1)
MONOCYTES NFR BLD AUTO: 4.2 %
NEUTROPHIL ABS PRELIM: 7.39 X10 (3) UL (ref 1.3–6.7)
NEUTROPHILS # BLD AUTO: 7.39 X10(3) UL (ref 1.3–6.7)
NEUTROPHILS NFR BLD AUTO: 67.3 %
PLATELET # BLD AUTO: 174 10(3)UL (ref 150–450)
POTASSIUM SERPL-SCNC: 5 MMOL/L (ref 3.6–5.1)
RBC # BLD AUTO: 3.31 X10(6)UL (ref 3.8–5.1)
RED CELL DISTRIBUTION WIDTH-SD: 43.8 FL (ref 35.1–46.3)
SODIUM SERPL-SCNC: 139 MMOL/L (ref 136–144)
VENOUS BASE EXCESS: -5.3
VENOUS BLOOD GAS HCO3: 17.9 MEQ/L (ref 23–27)
VENOUS O2 SAT CALC: 92 % (ref 72–78)
VENOUS O2 SATURATION: 90 % (ref 72–78)
VENOUS PCO2: 28 MM HG (ref 38–50)
VENOUS PH: 7.43 (ref 7.33–7.43)
VENOUS PO2: 62 MM HG (ref 30–50)
WBC # BLD AUTO: 11 X10(3) UL (ref 4–13)

## 2018-07-03 PROCEDURE — 82803 BLOOD GASES ANY COMBINATION: CPT | Performed by: EMERGENCY MEDICINE

## 2018-07-03 PROCEDURE — 94640 AIRWAY INHALATION TREATMENT: CPT

## 2018-07-03 PROCEDURE — 96374 THER/PROPH/DIAG INJ IV PUSH: CPT

## 2018-07-03 PROCEDURE — 94644 CONT INHLJ TX 1ST HOUR: CPT

## 2018-07-03 PROCEDURE — 94645 CONT INHLJ TX EACH ADDL HOUR: CPT

## 2018-07-03 PROCEDURE — S0028 INJECTION, FAMOTIDINE, 20 MG: HCPCS | Performed by: NURSE PRACTITIONER

## 2018-07-03 PROCEDURE — 96375 TX/PRO/DX INJ NEW DRUG ADDON: CPT

## 2018-07-03 PROCEDURE — 71045 X-RAY EXAM CHEST 1 VIEW: CPT | Performed by: EMERGENCY MEDICINE

## 2018-07-03 PROCEDURE — 99214 OFFICE O/P EST MOD 30 MIN: CPT

## 2018-07-03 PROCEDURE — 87081 CULTURE SCREEN ONLY: CPT | Performed by: NURSE PRACTITIONER

## 2018-07-03 PROCEDURE — 99291 CRITICAL CARE FIRST HOUR: CPT

## 2018-07-03 PROCEDURE — 96365 THER/PROPH/DIAG IV INF INIT: CPT

## 2018-07-03 PROCEDURE — 94660 CPAP INITIATION&MGMT: CPT

## 2018-07-03 PROCEDURE — 80053 COMPREHEN METABOLIC PANEL: CPT | Performed by: EMERGENCY MEDICINE

## 2018-07-03 PROCEDURE — 85025 COMPLETE CBC W/AUTO DIFF WBC: CPT | Performed by: EMERGENCY MEDICINE

## 2018-07-03 RX ORDER — METHYLPREDNISOLONE SODIUM SUCCINATE 125 MG/2ML
125 INJECTION, POWDER, LYOPHILIZED, FOR SOLUTION INTRAMUSCULAR; INTRAVENOUS ONCE
Status: DISCONTINUED | OUTPATIENT
Start: 2018-07-03 | End: 2018-07-03

## 2018-07-03 RX ORDER — METHYLPREDNISOLONE SODIUM SUCCINATE 125 MG/2ML
60 INJECTION, POWDER, LYOPHILIZED, FOR SOLUTION INTRAMUSCULAR; INTRAVENOUS EVERY 6 HOURS
Status: DISCONTINUED | OUTPATIENT
Start: 2018-07-03 | End: 2018-07-04

## 2018-07-03 RX ORDER — ONDANSETRON 2 MG/ML
4 INJECTION INTRAMUSCULAR; INTRAVENOUS EVERY 6 HOURS PRN
Status: DISCONTINUED | OUTPATIENT
Start: 2018-07-03 | End: 2018-07-06

## 2018-07-03 RX ORDER — DEXAMETHASONE SODIUM PHOSPHATE 4 MG/ML
10 VIAL (ML) INJECTION ONCE
Status: COMPLETED | OUTPATIENT
Start: 2018-07-03 | End: 2018-07-03

## 2018-07-03 RX ORDER — QUETIAPINE 100 MG/1
300 TABLET, FILM COATED ORAL NIGHTLY
Status: DISCONTINUED | OUTPATIENT
Start: 2018-07-03 | End: 2018-07-06

## 2018-07-03 RX ORDER — PREDNISONE 10 MG/1
10 TABLET ORAL DAILY
Status: ON HOLD | COMMUNITY
Start: 2018-07-06 | End: 2018-07-06

## 2018-07-03 RX ORDER — FAMOTIDINE 20 MG/1
20 TABLET ORAL 2 TIMES DAILY
Status: DISCONTINUED | OUTPATIENT
Start: 2018-07-03 | End: 2018-07-06

## 2018-07-03 RX ORDER — DIPHENHYDRAMINE HCL 25 MG
25 TABLET ORAL EVERY 6 HOURS PRN
COMMUNITY

## 2018-07-03 RX ORDER — METHYLPREDNISOLONE SODIUM SUCCINATE 125 MG/2ML
125 INJECTION, POWDER, LYOPHILIZED, FOR SOLUTION INTRAMUSCULAR; INTRAVENOUS ONCE
Status: COMPLETED | OUTPATIENT
Start: 2018-07-03 | End: 2018-07-03

## 2018-07-03 RX ORDER — SODIUM CHLORIDE 9 MG/ML
INJECTION, SOLUTION INTRAVENOUS CONTINUOUS
Status: DISCONTINUED | OUTPATIENT
Start: 2018-07-03 | End: 2018-07-05

## 2018-07-03 RX ORDER — DEXTROSE AND SODIUM CHLORIDE 5; .45 G/100ML; G/100ML
INJECTION, SOLUTION INTRAVENOUS CONTINUOUS
Status: DISCONTINUED | OUTPATIENT
Start: 2018-07-03 | End: 2018-07-03

## 2018-07-03 RX ORDER — ENOXAPARIN SODIUM 100 MG/ML
40 INJECTION SUBCUTANEOUS NIGHTLY
Status: DISCONTINUED | OUTPATIENT
Start: 2018-07-03 | End: 2018-07-06

## 2018-07-03 RX ORDER — ASCORBIC ACID 500 MG
500 TABLET ORAL DAILY
Status: ON HOLD | COMMUNITY
End: 2018-07-06

## 2018-07-03 RX ORDER — MAGNESIUM SULFATE HEPTAHYDRATE 40 MG/ML
2 INJECTION, SOLUTION INTRAVENOUS ONCE
Status: COMPLETED | OUTPATIENT
Start: 2018-07-03 | End: 2018-07-03

## 2018-07-03 RX ORDER — DIPHENHYDRAMINE HCL 25 MG
25 CAPSULE ORAL EVERY 6 HOURS PRN
Status: DISCONTINUED | OUTPATIENT
Start: 2018-07-03 | End: 2018-07-06

## 2018-07-03 RX ORDER — ASCORBIC ACID 500 MG
500 TABLET ORAL DAILY
COMMUNITY

## 2018-07-03 RX ORDER — PREDNISONE 10 MG/1
20 TABLET ORAL DAILY
Status: ON HOLD | COMMUNITY
Start: 2018-07-03 | End: 2018-07-06

## 2018-07-03 RX ORDER — MONTELUKAST SODIUM 10 MG/1
10 TABLET ORAL NIGHTLY
Status: DISCONTINUED | OUTPATIENT
Start: 2018-07-03 | End: 2018-07-06

## 2018-07-03 RX ORDER — IPRATROPIUM BROMIDE AND ALBUTEROL SULFATE 2.5; .5 MG/3ML; MG/3ML
3 SOLUTION RESPIRATORY (INHALATION)
Status: DISCONTINUED | OUTPATIENT
Start: 2018-07-03 | End: 2018-07-04

## 2018-07-03 RX ORDER — IPRATROPIUM BROMIDE AND ALBUTEROL SULFATE 2.5; .5 MG/3ML; MG/3ML
3 SOLUTION RESPIRATORY (INHALATION) ONCE
Status: COMPLETED | OUTPATIENT
Start: 2018-07-03 | End: 2018-07-03

## 2018-07-03 RX ORDER — ATORVASTATIN CALCIUM 20 MG/1
20 TABLET, FILM COATED ORAL DAILY
Status: DISCONTINUED | OUTPATIENT
Start: 2018-07-03 | End: 2018-07-06

## 2018-07-03 RX ORDER — MELATONIN
325
COMMUNITY

## 2018-07-03 RX ORDER — FAMOTIDINE 10 MG/ML
20 INJECTION, SOLUTION INTRAVENOUS 2 TIMES DAILY
Status: DISCONTINUED | OUTPATIENT
Start: 2018-07-03 | End: 2018-07-06

## 2018-07-03 RX ORDER — METOCLOPRAMIDE HYDROCHLORIDE 5 MG/ML
10 INJECTION INTRAMUSCULAR; INTRAVENOUS EVERY 8 HOURS PRN
Status: DISCONTINUED | OUTPATIENT
Start: 2018-07-03 | End: 2018-07-05

## 2018-07-03 RX ORDER — ACETAMINOPHEN 325 MG/1
650 TABLET ORAL EVERY 6 HOURS PRN
Status: DISCONTINUED | OUTPATIENT
Start: 2018-07-03 | End: 2018-07-06

## 2018-07-03 NOTE — ED PROVIDER NOTES
Patient Seen in: [facility] Immediate Care In [facility] & [facility]    History   Patient presents with:  Dyspnea MILY SOB (respiratory)    Stated Complaint: SOB/WHEEZING     HPI  Patient is a 59-year-old female who presents with shortness of breath and wheezing.   Jose reviewed. All other systems reviewed and negative except as noted above.     Physical Exam   ED Triage Vitals [07/03/18 1742]  BP: 142/74  Pulse: 82  Resp: 26  Temp: 97.8 °F (36.6 °C)  Temp src: Temporal  SpO2: 97 %  O2 Device: None (Room air)    Ancelmo full sentences. Reassessment following #2  nebulizer treatment:  Lungs with no improved air movement. Wheezes auscultated. Air movement is very restricted. Pulse ox is 98% on room air. Patient denies relief of symptoms.          ED Course as of Jul

## 2018-07-04 LAB
ADENOVIRUS PCR:: NEGATIVE
B PERT DNA SPEC QL NAA+PROBE: NEGATIVE
BUN BLD-MCNC: 22 MG/DL (ref 8–20)
C PNEUM DNA SPEC QL NAA+PROBE: NEGATIVE
CALCIUM BLD-MCNC: 8.9 MG/DL (ref 8.3–10.3)
CHLORIDE: 106 MMOL/L (ref 101–111)
CO2: 21 MMOL/L (ref 22–32)
CORONAVIRUS 229E PCR:: NEGATIVE
CORONAVIRUS HKU1 PCR:: NEGATIVE
CORONAVIRUS NL63 PCR:: NEGATIVE
CORONAVIRUS OC43 PCR:: NEGATIVE
CREAT BLD-MCNC: 0.84 MG/DL (ref 0.55–1.02)
ERYTHROCYTE [DISTWIDTH] IN BLOOD BY AUTOMATED COUNT: 13.7 % (ref 11.5–16)
FLUAV RNA SPEC QL NAA+PROBE: NEGATIVE
FLUBV RNA SPEC QL NAA+PROBE: NEGATIVE
GLUCOSE BLD-MCNC: 172 MG/DL (ref 70–99)
GLUCOSE BLD-MCNC: 194 MG/DL (ref 65–99)
GLUCOSE BLD-MCNC: 204 MG/DL (ref 65–99)
GLUCOSE BLD-MCNC: 224 MG/DL (ref 65–99)
HAV IGM SER QL: 2.4 MG/DL (ref 1.8–2.5)
HCT VFR BLD AUTO: 34.5 % (ref 34–50)
HGB BLD-MCNC: 10.9 G/DL (ref 12–16)
MCH RBC QN AUTO: 27.1 PG (ref 27–33.2)
MCHC RBC AUTO-ENTMCNC: 31.6 G/DL (ref 31–37)
MCV RBC AUTO: 85.8 FL (ref 81–100)
METAPNEUMOVIRUS PCR:: NEGATIVE
MYCOPLASMA PNEUMONIA PCR:: NEGATIVE
PARAINFLUENZA 1 PCR:: NEGATIVE
PARAINFLUENZA 2 PCR:: NEGATIVE
PARAINFLUENZA 3 PCR:: NEGATIVE
PARAINFLUENZA 4 PCR:: NEGATIVE
PHOSPHATE SERPL-MCNC: 3.9 MG/DL (ref 2.5–4.9)
PLATELET # BLD AUTO: 219 10(3)UL (ref 150–450)
POTASSIUM SERPL-SCNC: 4.1 MMOL/L (ref 3.6–5.1)
RBC # BLD AUTO: 4.02 X10(6)UL (ref 3.8–5.1)
RED CELL DISTRIBUTION WIDTH-SD: 43 FL (ref 35.1–46.3)
RHINOVIRUS/ENTERO PCR:: NEGATIVE
RSV RNA SPEC QL NAA+PROBE: NEGATIVE
SODIUM SERPL-SCNC: 140 MMOL/L (ref 136–144)
WBC # BLD AUTO: 14.7 X10(3) UL (ref 4–13)

## 2018-07-04 PROCEDURE — 87486 CHLMYD PNEUM DNA AMP PROBE: CPT | Performed by: HOSPITALIST

## 2018-07-04 PROCEDURE — 87999 UNLISTED MICROBIOLOGY PX: CPT

## 2018-07-04 PROCEDURE — 94640 AIRWAY INHALATION TREATMENT: CPT

## 2018-07-04 PROCEDURE — 94660 CPAP INITIATION&MGMT: CPT

## 2018-07-04 PROCEDURE — 82962 GLUCOSE BLOOD TEST: CPT

## 2018-07-04 PROCEDURE — 87633 RESP VIRUS 12-25 TARGETS: CPT | Performed by: HOSPITALIST

## 2018-07-04 PROCEDURE — 87581 M.PNEUMON DNA AMP PROBE: CPT | Performed by: HOSPITALIST

## 2018-07-04 PROCEDURE — 80048 BASIC METABOLIC PNL TOTAL CA: CPT | Performed by: NURSE PRACTITIONER

## 2018-07-04 PROCEDURE — 84100 ASSAY OF PHOSPHORUS: CPT | Performed by: NURSE PRACTITIONER

## 2018-07-04 PROCEDURE — 83735 ASSAY OF MAGNESIUM: CPT | Performed by: NURSE PRACTITIONER

## 2018-07-04 PROCEDURE — 85027 COMPLETE CBC AUTOMATED: CPT | Performed by: NURSE PRACTITIONER

## 2018-07-04 PROCEDURE — 94664 DEMO&/EVAL PT USE INHALER: CPT

## 2018-07-04 PROCEDURE — 87798 DETECT AGENT NOS DNA AMP: CPT | Performed by: HOSPITALIST

## 2018-07-04 RX ORDER — IPRATROPIUM BROMIDE AND ALBUTEROL SULFATE 2.5; .5 MG/3ML; MG/3ML
3 SOLUTION RESPIRATORY (INHALATION)
Status: DISCONTINUED | OUTPATIENT
Start: 2018-07-04 | End: 2018-07-05

## 2018-07-04 RX ORDER — MELATONIN
325
Status: DISCONTINUED | OUTPATIENT
Start: 2018-07-04 | End: 2018-07-06

## 2018-07-04 RX ORDER — LORAZEPAM 2 MG/ML
1 INJECTION INTRAMUSCULAR ONCE
Status: COMPLETED | OUTPATIENT
Start: 2018-07-04 | End: 2018-07-04

## 2018-07-04 RX ORDER — DEXTROSE MONOHYDRATE 25 G/50ML
50 INJECTION, SOLUTION INTRAVENOUS
Status: DISCONTINUED | OUTPATIENT
Start: 2018-07-04 | End: 2018-07-06

## 2018-07-04 RX ORDER — PREDNISONE 20 MG/1
40 TABLET ORAL
Status: DISCONTINUED | OUTPATIENT
Start: 2018-07-04 | End: 2018-07-06

## 2018-07-04 RX ORDER — LORAZEPAM 2 MG/ML
INJECTION INTRAMUSCULAR
Status: COMPLETED
Start: 2018-07-04 | End: 2018-07-04

## 2018-07-04 RX ORDER — CLONAZEPAM 0.5 MG/1
0.5 TABLET ORAL 2 TIMES DAILY PRN
Status: DISCONTINUED | OUTPATIENT
Start: 2018-07-04 | End: 2018-07-06

## 2018-07-04 RX ORDER — CETIRIZINE HYDROCHLORIDE 10 MG/1
10 TABLET ORAL DAILY
Status: DISCONTINUED | OUTPATIENT
Start: 2018-07-04 | End: 2018-07-04

## 2018-07-04 RX ORDER — IPRATROPIUM BROMIDE AND ALBUTEROL SULFATE 2.5; .5 MG/3ML; MG/3ML
3 SOLUTION RESPIRATORY (INHALATION)
Status: DISCONTINUED | OUTPATIENT
Start: 2018-07-04 | End: 2018-07-04

## 2018-07-04 RX ORDER — ESCITALOPRAM OXALATE 20 MG/1
40 TABLET ORAL DAILY
Status: DISCONTINUED | OUTPATIENT
Start: 2018-07-04 | End: 2018-07-04

## 2018-07-04 RX ORDER — ESCITALOPRAM OXALATE 20 MG/1
20 TABLET ORAL DAILY
Status: DISCONTINUED | OUTPATIENT
Start: 2018-07-04 | End: 2018-07-06

## 2018-07-04 RX ORDER — MONTELUKAST SODIUM 10 MG/1
10 TABLET ORAL NIGHTLY
Status: DISCONTINUED | OUTPATIENT
Start: 2018-07-04 | End: 2018-07-04

## 2018-07-04 RX ORDER — FENOFIBRATE 134 MG/1
134 CAPSULE ORAL
Status: DISCONTINUED | OUTPATIENT
Start: 2018-07-04 | End: 2018-07-06

## 2018-07-04 RX ORDER — DOXEPIN HYDROCHLORIDE 50 MG/1
1 CAPSULE ORAL DAILY
Status: DISCONTINUED | OUTPATIENT
Start: 2018-07-04 | End: 2018-07-06

## 2018-07-04 RX ORDER — CETIRIZINE HYDROCHLORIDE 10 MG/1
10 TABLET ORAL DAILY
Status: DISCONTINUED | OUTPATIENT
Start: 2018-07-04 | End: 2018-07-06

## 2018-07-04 RX ORDER — IPRATROPIUM BROMIDE AND ALBUTEROL SULFATE 2.5; .5 MG/3ML; MG/3ML
3 SOLUTION RESPIRATORY (INHALATION) EVERY 4 HOURS PRN
Status: DISCONTINUED | OUTPATIENT
Start: 2018-07-04 | End: 2018-07-06

## 2018-07-04 NOTE — ED NOTES
Pt still presents w/ slight stridor sound, not as pronounced as prior to racemic epi breathing treatment. Pt rpt still feeling short of breath.

## 2018-07-04 NOTE — PROGRESS NOTES
ICU  Critical Care APRN Progress Note    NAME: Guerline Crook - ROOM: [address] [place] [address] - MRN: [id number] - Age: 40year old - :8/3/1980    History Of Present Illness:  Guerline Crook is a 40year old female with PMHx significant for HLD, anxiety dyspnea she reports it more with inspiration vs expiration. Denies any new exposure to medication, food, or known environmental exposure.       PMH:  Past Medical History:   Diagnosis Date   • Anxiety    • Arrhythmia    • CERVICAL DYSPLASIA 7/2011   • Depr vascularity. No focal infiltrate, consolidation, effusion or pneumothorax. Dictated by: Ramón Hunter MD on 7/03/2018 at 19:43     Approved by: Ramón Hunter MD            Labs reviewed    Assessment/Plan:  1.   Acute respiratory distress/dyspnea--c/

## 2018-07-04 NOTE — H&P
[facility] hospitalist H+P  PCP; Rosa M Reynoso MD  CC SOB  HPI 41 yo female with hx of depression, anxiety, seasonal allergies, asthma here with SOB. Diagnosed with acute asthma exacerbation, admitted to ICU, now  on BIPAP, per patient SOB better with BIPAP.  No p Comment:Blisters  Shellfish-Derived P*      Meds; No current facility-administered medications on file prior to encounter.    Current Outpatient Prescriptions on File Prior to Encounter:  albuterol sulfate (2.5 MG/3ML) 0.083% Inhalation Nebu Soln Take 3 14.7  hg 10.9 Plt 219    BUN 22 Creatinine 0.84  CXR personally reviewed result in EPIC    =====  CONCLUSION:  Normal heart size and pulmonary vascularity. No focal infiltrate, consolidation, effusion or pneumothorax.             Assessment/plan  Acute

## 2018-07-04 NOTE — ED NOTES
ER Phys at Pt bedside for re-assessment. Pt breathing more relaxed, however, Pt cont to have diminished breath sounds and stridor still noted.  Resp called immediately for another hour long breathing treatment

## 2018-07-04 NOTE — PLAN OF CARE
RESPIRATORY - ADULT    • Achieves optimal ventilation and oxygenation Progressing        Pt received in bed able to communicate her needs. Friend at bedside. Pt is able to eat, drink and take meds with no issues.  Pt has used the bipap rarely and was remove

## 2018-07-04 NOTE — ED PROVIDER NOTES
Patient Seen in: [facility] Emergency Department    History   Patient presents with:  Dyspnea MILY SOB (respiratory)    Stated Complaint: MILY    HPI    This is a very pleasant 71-year-old female who presents from immediate care with asthma exacerbati socially      Review of Systems    Positive for stated complaint: MILY  Other systems are as noted in HPI. Constitutional and vital signs reviewed. All other systems reviewed and negative except as noted above.     Physical Exam   ED Triage Vitals [07/ orders were created for panel order CBC WITH DIFFERENTIAL WITH PLATELET.   Procedure                               Abnormality         Status                     ---------                               -----------         ------                     CBC W/ D almost immediately and had some stridor. She was given a racemic epinephrine and 10 of Decadron. This calmed her down and she felt better again. However, within a short period time she is again having difficulty breathing.   She is placed in a continuous 6/26/2018          ICD-10-CM Noted POA    * (Principal)Moderate persistent asthma with exacerbation J45.41 7/3/2018 Unknown    Stridor R06.1 7/3/2018

## 2018-07-04 NOTE — PLAN OF CARE
RESPIRATORY - ADULT    • Achieves optimal ventilation and oxygenation Progressing          Patient alert, oriented, following commands. Denies pain. Admitted from ER on room air. Intermittently, patient was getting tachypneic with a RR in the 40's.  Loretta Edwards

## 2018-07-04 NOTE — ED NOTES
Pt assessed during hour long breathing treatment, Pt has audible stridor at this time.  ER Phys aware and respiratory to place Pt on racemic epi breathing treatment

## 2018-07-04 NOTE — RESPIRATORY THERAPY NOTE
Received pt on continuous neb. Pt taken off. Within an hour Racemic epi and duoneb given and started on BIPAP, Q2 Duoneb ordered. Pt unable to do peak flow at this time. Another continuous given at 0200, pt breathing 50s.

## 2018-07-04 NOTE — CONSULTS
Pulmonary Consult       NAME: Citlalli Gomez - ROOM: 460/460-A - MRN: [id number] - Age: 40year old - :  8/3/1980    Date of Admission: 7/3/2018  7:08 PM  Admission Diagnosis: Stridor [R06.1]  Moderate persistent asthma with exacerbation [J45.41] ampule Rfl: 0 7/3/2018 at Unknown time   Budesonide-Formoterol Fumarate (SYMBICORT) 160-4.5 MCG/ACT Inhalation Aerosol Inhale 2 puffs into the lungs 2 (two) times daily. Drink water after use.  Disp: 1 Inhaler Rfl: 5 7/3/2018 at 0900   atorvastatin 20 MG Or Never Used    Alcohol use Yes    Comment: socially    Drug use: No    Sexual activity: Not Currently    Partners: Female    Birth control/ protection: OCP     Other Topics Concern   None on file     Social History Narrative    .   Works as a  Continuous  Oximetry Probe Site Changed: No  Pulse Ox Probe Location: Left hand                  Wt Readings from Last 3 Encounters:  07/03/18 : 175 lb (79.4 kg)  06/30/18 : 175 lb (79.4 kg)  06/12/18 : 178 lb 12.8 oz (81.1 kg)        Intake/Output Summary Venous pH 7.43 7.33 - 7.43   Venous pCO2 28 (L) 38 - 50 mm Hg   Venous pO2 62 (H) 30 - 50 mm Hg   Venous O2 Saturation 90 (H) 72 - 78 %   Venous O2 Sat.  Calc. 92 (H) 72 - 78 %   Venous Bicarbonate 17.9 (L) 23.0 - 27.0 mEq/L   Venous Base Excess -5.3    V 13.0 x10(3) uL   RBC 4.02 3.80 - 5.10 x10(6)uL   HGB 10.9 (L) 12.0 - 16.0 g/dL   HCT 34.5 34.0 - 50.0 %   .0 150.0 - 450.0 10(3)uL   MCV 85.8 81.0 - 100.0 fL   MCH 27.1 27.0 - 33.2 pg   MCHC 31.6 31.0 - 37.0 g/dL   RDW 13.7 11.5 - 16.0 %   RDW-SD 43

## 2018-07-04 NOTE — ED INITIAL ASSESSMENT (HPI)
Pt hx asthma, Pt sent by [facility] for respiratory distress via ambulance, Pt received 2 duo nebs and solumedrol.

## 2018-07-05 LAB
GLUCOSE BLD-MCNC: 153 MG/DL (ref 65–99)
GLUCOSE BLD-MCNC: 153 MG/DL (ref 65–99)
GLUCOSE BLD-MCNC: 178 MG/DL (ref 65–99)

## 2018-07-05 PROCEDURE — 82962 GLUCOSE BLOOD TEST: CPT

## 2018-07-05 PROCEDURE — 94660 CPAP INITIATION&MGMT: CPT

## 2018-07-05 PROCEDURE — 94640 AIRWAY INHALATION TREATMENT: CPT

## 2018-07-05 RX ORDER — IPRATROPIUM BROMIDE AND ALBUTEROL SULFATE 2.5; .5 MG/3ML; MG/3ML
3 SOLUTION RESPIRATORY (INHALATION)
Status: DISCONTINUED | OUTPATIENT
Start: 2018-07-05 | End: 2018-07-05

## 2018-07-05 RX ORDER — DOCUSATE SODIUM 100 MG/1
100 CAPSULE, LIQUID FILLED ORAL 2 TIMES DAILY
Status: DISCONTINUED | OUTPATIENT
Start: 2018-07-05 | End: 2018-07-06

## 2018-07-05 RX ORDER — IPRATROPIUM BROMIDE AND ALBUTEROL SULFATE 2.5; .5 MG/3ML; MG/3ML
3 SOLUTION RESPIRATORY (INHALATION)
Status: DISCONTINUED | OUTPATIENT
Start: 2018-07-05 | End: 2018-07-06

## 2018-07-05 NOTE — PROGRESS NOTES
RECEIVED PT FROM ICU  TO , VIA WHEELCHAIR ACCOMPANUIED BY PARTNER. VS STABLE. PT DENIES ANY SYMPTOMS. DISCUSSED PLAN OF CARE.

## 2018-07-05 NOTE — PLAN OF CARE
ANXIETY    • Will report anxiety at manageable levels Progressing        Diabetes/Glucose Control    • Glucose maintained within prescribed range Progressing        Patient/Family Goals    • Patient/Family Long Term Goal Progressing    • Patient/Family Magy

## 2018-07-05 NOTE — PAYOR COMM NOTE
--------------  ADMISSION REVIEW     Payor: [address]  Subscriber #:  [id number]  Authorization Number: N/A    Admit date: 7/3/18  Admit time: 2213       Admitting Physician: Laury Tiwari MD  Attending Physician:  Laury Tiwari MD  Primary C removed  2009: SINUS SURGERY        Comment: Deviated septum repair  1996: TONSILLECTOMY      Review of Systems    Positive for stated complaint: MILY  Other systems are as noted in HPI. Constitutional and vital signs reviewed.       All other systems revie (cpt=71045)    Result Date: 7/3/2018  PROCEDURE:  XR CHEST AP PORTABLE  (CPT=71045)  TECHNIQUE:  AP chest radiograph was obtained. COMPARISON:  RE GOMEZ CHEST PA + LAT CHEST (CPT=71046), 6/30/2018, 14:24.   INDICATIONS:  MILY, Respiratory Distress  P hospitalist Dr. Karuna Fritz as well as pulmonary Dr. Eusebio Nava.       Disposition and Plan     Clinical Impression:  Moderate persistent asthma with exacerbation  (primary encounter diagnosis)  Stridor  Anemia, unspecified type      Present on Admission  Date Rev hpi  Pe;   07/04/18  0700   BP: 129/81   Pulse: 88   Resp: (!) 27   Temp:      Gen: awake, alert, on BIPAP  HEENT; mmm, anicteric, atraumatic, EOMI  Neck supple  Lymph no tender cervical LAD  CV: RRR, nl S1/S2  Pulm: CTA b/l, no wheezing  Abd; soft, not te 2102 Given 2 Units Subcutaneous (Left Upper Abdomen) Martinez Holbrook RN    7/4/2018 1845 Given 3 Units Subcutaneous (Right Upper Arm) Ishaan Queen RN      ipratropium-albuterol (DUONEB) nebulizer solution 3 mL     Date Action Dose Route User

## 2018-07-05 NOTE — PROGRESS NOTES
[facility] hospitalis daily note  Patient was seen/examined on 7/5/18    S; off bipap and out of ICU.  Breathing is better    Medications in EPIC    PE;    07/05/18  1143   BP: 116/69   Pulse: 81   Resp: 18   Temp: 98.8 °F (37.1 °C)     Gen: awake, alert, no respi

## 2018-07-05 NOTE — PLAN OF CARE
ANXIETY    • Will report anxiety at manageable levels Progressing          RESPIRATORY - ADULT    • Achieves optimal ventilation and oxygenation Progressing        Received pt sitting in the bed, life partner at bedside.  Pt on room air, denies any breathin

## 2018-07-05 NOTE — PROGRESS NOTES
Critical Care Progress Note     Assessment / Plan:  1. Acute resp failure - due to asthma exacerbation with component of vocal cord dysfunction and anxiety  - off bipap  - on RA  2. Asthma exac   - prednisone burst   - Breo for Symbicort   - BD  3.  Vocal c

## 2018-07-06 VITALS
TEMPERATURE: 98 F | HEIGHT: 63 IN | OXYGEN SATURATION: 97 % | SYSTOLIC BLOOD PRESSURE: 124 MMHG | BODY MASS INDEX: 31.38 KG/M2 | RESPIRATION RATE: 20 BRPM | WEIGHT: 177.13 LBS | HEART RATE: 71 BPM | DIASTOLIC BLOOD PRESSURE: 71 MMHG

## 2018-07-06 LAB
BUN BLD-MCNC: 25 MG/DL (ref 8–20)
CALCIUM BLD-MCNC: 9.3 MG/DL (ref 8.3–10.3)
CHLORIDE: 106 MMOL/L (ref 101–111)
CO2: 28 MMOL/L (ref 22–32)
CREAT BLD-MCNC: 0.87 MG/DL (ref 0.55–1.02)
GLUCOSE BLD-MCNC: 105 MG/DL (ref 65–99)
GLUCOSE BLD-MCNC: 108 MG/DL (ref 70–99)
GLUCOSE BLD-MCNC: 133 MG/DL (ref 65–99)
GLUCOSE BLD-MCNC: 201 MG/DL (ref 65–99)
POTASSIUM SERPL-SCNC: 4.2 MMOL/L (ref 3.6–5.1)
SODIUM SERPL-SCNC: 141 MMOL/L (ref 136–144)

## 2018-07-06 PROCEDURE — 82962 GLUCOSE BLOOD TEST: CPT

## 2018-07-06 PROCEDURE — 94640 AIRWAY INHALATION TREATMENT: CPT

## 2018-07-06 PROCEDURE — 87086 URINE CULTURE/COLONY COUNT: CPT | Performed by: INTERNAL MEDICINE

## 2018-07-06 PROCEDURE — 80048 BASIC METABOLIC PNL TOTAL CA: CPT | Performed by: HOSPITALIST

## 2018-07-06 PROCEDURE — 94664 DEMO&/EVAL PT USE INHALER: CPT

## 2018-07-06 RX ORDER — PREDNISONE 20 MG/1
40 TABLET ORAL DAILY
Qty: 10 TABLET | Refills: 0 | Status: SHIPPED | OUTPATIENT
Start: 2018-07-06 | End: 2018-07-12 | Stop reason: ALTCHOICE

## 2018-07-06 RX ORDER — IPRATROPIUM BROMIDE AND ALBUTEROL SULFATE 2.5; .5 MG/3ML; MG/3ML
3 SOLUTION RESPIRATORY (INHALATION) EVERY 4 HOURS PRN
Status: DISCONTINUED | OUTPATIENT
Start: 2018-07-06 | End: 2018-07-06

## 2018-07-06 NOTE — CM/SW NOTE
07/06/18 1400   CM/SW Screening   Referral Source    Information Source Chart review;Nursing rounds   Patient's Mental Status Alert;Oriented   Patient's [address]   Patient lives with Spouse   Patient Status Prior to Admission

## 2018-07-06 NOTE — PROGRESS NOTES
Pulmonary Progress Note        NAME: Luther Lovett [facility]: [id number] - MRN: [id number] - Age: 40year old - : 8/3/1980        SUBJECTIVE: No events overnight, chest feels heavy and tired    OBJECTIVE:   18  2314 18  0026 18  05 organomegaly  Extremities: extremities normal, atraumatic, no cyanosis or edema      Recent Labs   07/03/18 1907 07/04/18 0425   WBC 11.0 14.7*   HGB 9.1* 10.9*   MCV 87.0 85.8   .0 219.0         Recent Labs   07/03/18 1907 07/04/18 0425 07/06/1

## 2018-07-06 NOTE — DISCHARGE SUMMARY
[facility]  Discharge Summary    [facility] Patient Status:  Inpatient    8/3/1980 MRN [id number]   [facility] 5NW-A Attending Carla Nunn MD   Hosp Day # 3 PCP Jus Jones MD     Date of Admission: 7/3/2018    Date of BIPAP, per patient SOB better with BIPAP. No pain, no fever.  Pain quantity, quality, duration, radiation absent      Consultations: pulmonology    Procedures: please see EPIC    Complications: please see EPIC    Disposition:home   Discharge Condition: aliv Refills: 12  Associated Diagnoses:Environmental allergies    Levocetirizine Dihydrochloride (XYZAL) 5 MG Oral Tab  Take 1 tablet (5 mg total) by mouth nightly. Qty: 90 tablet Refills: 1    ClonazePAM 0.5 MG Oral Tab  Take 1 mg by mouth nightly as needed.

## 2018-07-06 NOTE — PROGRESS NOTES
NURSING DISCHARGE NOTE    Discharged Home via Wheelchair. Accompanied by Friend  Belongings Taken by patient/family. Pt is assessed and ready for discharge. Instructions gone over with patient, work note in hand. To lobby via [facility] with spouse.

## 2018-07-10 RX ORDER — LEVOCETIRIZINE DIHYDROCHLORIDE 5 MG/1
TABLET, FILM COATED ORAL
Qty: 90 TABLET | Refills: 1 | Status: SHIPPED | OUTPATIENT
Start: 2018-07-10 | End: 2018-07-23

## 2018-07-12 ENCOUNTER — LAB ENCOUNTER (OUTPATIENT)
Dept: LAB | Age: 38
End: 2018-07-12
Attending: INTERNAL MEDICINE
Payer: COMMERCIAL

## 2018-07-12 DIAGNOSIS — J45.21 MILD INTERMITTENT ASTHMA WITH EXACERBATION: ICD-10-CM

## 2018-07-12 DIAGNOSIS — J45.40 MODERATE PERSISTENT ASTHMA WITHOUT COMPLICATION: ICD-10-CM

## 2018-07-12 LAB — IMMUNOGLOBULIN E: 193 IU/ML (ref 3.6–114)

## 2018-07-12 PROCEDURE — 86003 ALLG SPEC IGE CRUDE XTRC EA: CPT

## 2018-07-12 PROCEDURE — 36415 COLL VENOUS BLD VENIPUNCTURE: CPT

## 2018-07-12 PROCEDURE — 82785 ASSAY OF IGE: CPT

## 2018-07-14 LAB
ALLERGEN, HONEYBEE VENOM IGE: <0.1 KU/L
ALLERGEN, PAPER WASP VENOM IGE: 0.36 KU/L
ALLERGEN, WHITE-FACED HORNET: 0.21 KU/L
ALLERGEN, YELLOW JACKET VENOM: 0.15 KU/L
ALLERGEN, YELLOW-FACED HORNET: 0.19 KU/L

## 2018-07-14 RX ORDER — EPINEPHRINE 0.3 MG/.3ML
INJECTION SUBCUTANEOUS
Qty: 1 EACH | Refills: 0 | Status: SHIPPED | OUTPATIENT
Start: 2018-07-14

## 2018-07-14 NOTE — TELEPHONE ENCOUNTER
----- Message from Lyudmila Wolff MD sent at 7/14/2018  7:29 AM CDT -----  Please call patient with recent serum IgE testing to hymenoptera including negative to yellow jacket yellow hornet white faced hornet and honeybee.   Patient did show sensitization

## 2018-07-14 NOTE — TELEPHONE ENCOUNTER
Spoke to patient. Verified name & . Reviewed w/ patient the following test results and recommendations in message below per Dr. Tavo Ty. Patient to contact our office with any additional questions or concerns. She verbalized understanding.      Current Epi

## 2018-07-16 PROBLEM — R73.09 ELEVATED HEMOGLOBIN A1C MEASUREMENT: Status: ACTIVE | Noted: 2018-07-16

## 2018-07-16 PROBLEM — J45.20 ASTHMA, ALLERGIC, MILD INTERMITTENT, UNCOMPLICATED: Status: RESOLVED | Noted: 2018-02-20 | Resolved: 2018-07-16

## 2018-07-16 PROBLEM — J45.40 MODERATE PERSISTENT ASTHMA, UNSPECIFIED WHETHER COMPLICATED: Status: ACTIVE | Noted: 2018-07-03

## 2018-07-17 NOTE — PROGRESS NOTES
Patient contacted and was notified of results. She verbalized understanding. All questions answered.

## 2018-07-21 ENCOUNTER — APPOINTMENT (OUTPATIENT)
Dept: GENERAL RADIOLOGY | Facility: HOSPITAL | Age: 38
End: 2018-07-21
Attending: EMERGENCY MEDICINE
Payer: COMMERCIAL

## 2018-07-21 ENCOUNTER — APPOINTMENT (OUTPATIENT)
Dept: CT IMAGING | Facility: HOSPITAL | Age: 38
End: 2018-07-21
Attending: EMERGENCY MEDICINE
Payer: COMMERCIAL

## 2018-07-21 ENCOUNTER — HOSPITAL ENCOUNTER (EMERGENCY)
Facility: HOSPITAL | Age: 38
Discharge: HOME | End: 2018-07-21
Attending: EMERGENCY MEDICINE
Payer: COMMERCIAL

## 2018-07-21 VITALS
WEIGHT: 177 LBS | OXYGEN SATURATION: 97 % | RESPIRATION RATE: 20 BRPM | TEMPERATURE: 98 F | BODY MASS INDEX: 31 KG/M2 | SYSTOLIC BLOOD PRESSURE: 117 MMHG | HEART RATE: 85 BPM | DIASTOLIC BLOOD PRESSURE: 65 MMHG

## 2018-07-21 DIAGNOSIS — R06.1 STRIDOR: Primary | ICD-10-CM

## 2018-07-21 DIAGNOSIS — R06.00 DYSPNEA, UNSPECIFIED TYPE: ICD-10-CM

## 2018-07-21 LAB
ALBUMIN SERPL-MCNC: 4 G/DL (ref 3.5–4.8)
ALBUMIN/GLOB SERPL: 1.4 {RATIO} (ref 1–2)
ALP LIVER SERPL-CCNC: 24 U/L (ref 37–98)
ALT SERPL-CCNC: 34 U/L (ref 14–54)
ANION GAP SERPL CALC-SCNC: 9 MMOL/L (ref 0–18)
AST SERPL-CCNC: 22 U/L (ref 15–41)
BASOPHILS # BLD AUTO: 0.04 X10(3) UL (ref 0–0.1)
BASOPHILS NFR BLD AUTO: 0.7 %
BILIRUB SERPL-MCNC: 0.3 MG/DL (ref 0.1–2)
BILIRUB UR QL STRIP.AUTO: NEGATIVE
BUN BLD-MCNC: 15 MG/DL (ref 8–20)
BUN/CREAT SERPL: 18.8 (ref 10–20)
CALCIUM BLD-MCNC: 9.6 MG/DL (ref 8.3–10.3)
CHLORIDE SERPL-SCNC: 108 MMOL/L (ref 101–111)
CLARITY UR REFRACT.AUTO: CLEAR
CO2 SERPL-SCNC: 27 MMOL/L (ref 22–32)
CREAT BLD-MCNC: 0.8 MG/DL (ref 0.55–1.02)
EOSINOPHIL # BLD AUTO: 0.23 X10(3) UL (ref 0–0.3)
EOSINOPHIL NFR BLD AUTO: 3.9 %
ERYTHROCYTE [DISTWIDTH] IN BLOOD BY AUTOMATED COUNT: 13.5 % (ref 11.5–16)
GLOBULIN PLAS-MCNC: 2.9 G/DL (ref 2.5–3.7)
GLUCOSE BLD-MCNC: 100 MG/DL (ref 70–99)
GLUCOSE UR STRIP.AUTO-MCNC: NEGATIVE MG/DL
HCT VFR BLD AUTO: 35.8 % (ref 34–50)
HGB BLD-MCNC: 11.5 G/DL (ref 12–16)
IMMATURE GRANULOCYTE COUNT: 0.04 X10(3) UL (ref 0–1)
IMMATURE GRANULOCYTE RATIO %: 0.7 %
KETONES UR STRIP.AUTO-MCNC: NEGATIVE MG/DL
LEUKOCYTE ESTERASE UR QL STRIP.AUTO: NEGATIVE
LYMPHOCYTES # BLD AUTO: 2.6 X10(3) UL (ref 0.9–4)
LYMPHOCYTES NFR BLD AUTO: 44.4 %
M PROTEIN MFR SERPL ELPH: 6.9 G/DL (ref 6.1–8.3)
MCH RBC QN AUTO: 28 PG (ref 27–33.2)
MCHC RBC AUTO-ENTMCNC: 32.1 G/DL (ref 31–37)
MCV RBC AUTO: 87.3 FL (ref 81–100)
MONOCYTES # BLD AUTO: 0.46 X10(3) UL (ref 0.1–1)
MONOCYTES NFR BLD AUTO: 7.9 %
NEUTROPHIL ABS PRELIM: 2.48 X10 (3) UL (ref 1.3–6.7)
NEUTROPHILS # BLD AUTO: 2.48 X10(3) UL (ref 1.3–6.7)
NEUTROPHILS NFR BLD AUTO: 42.4 %
NITRITE UR QL STRIP.AUTO: NEGATIVE
OSMOLALITY SERPL CALC.SUM OF ELEC: 299 MOSM/KG (ref 275–295)
PH UR STRIP.AUTO: 7 [PH] (ref 4.5–8)
PLATELET # BLD AUTO: 173 10(3)UL (ref 150–450)
POTASSIUM SERPL-SCNC: 3.8 MMOL/L (ref 3.6–5.1)
PROT UR STRIP.AUTO-MCNC: NEGATIVE MG/DL
RBC # BLD AUTO: 4.1 X10(6)UL (ref 3.8–5.1)
RED CELL DISTRIBUTION WIDTH-SD: 43 FL (ref 35.1–46.3)
SODIUM SERPL-SCNC: 144 MMOL/L (ref 136–144)
SP GR UR STRIP.AUTO: 1 (ref 1–1.03)
UROBILINOGEN UR STRIP.AUTO-MCNC: <2 MG/DL
WBC # BLD AUTO: 5.9 X10(3) UL (ref 4–13)

## 2018-07-21 PROCEDURE — 94640 AIRWAY INHALATION TREATMENT: CPT

## 2018-07-21 PROCEDURE — 80053 COMPREHEN METABOLIC PANEL: CPT | Performed by: EMERGENCY MEDICINE

## 2018-07-21 PROCEDURE — 99285 EMERGENCY DEPT VISIT HI MDM: CPT

## 2018-07-21 PROCEDURE — 85025 COMPLETE CBC W/AUTO DIFF WBC: CPT | Performed by: EMERGENCY MEDICINE

## 2018-07-21 PROCEDURE — 70360 X-RAY EXAM OF NECK: CPT | Performed by: EMERGENCY MEDICINE

## 2018-07-21 PROCEDURE — 70491 CT SOFT TISSUE NECK W/DYE: CPT | Performed by: EMERGENCY MEDICINE

## 2018-07-21 PROCEDURE — 96374 THER/PROPH/DIAG INJ IV PUSH: CPT

## 2018-07-21 PROCEDURE — 71045 X-RAY EXAM CHEST 1 VIEW: CPT | Performed by: EMERGENCY MEDICINE

## 2018-07-21 PROCEDURE — 81001 URINALYSIS AUTO W/SCOPE: CPT | Performed by: EMERGENCY MEDICINE

## 2018-07-21 PROCEDURE — 96375 TX/PRO/DX INJ NEW DRUG ADDON: CPT

## 2018-07-21 RX ORDER — FEXOFENADINE HCL 180 MG/1
180 TABLET ORAL DAILY
Qty: 20 TABLET | Refills: 0 | Status: SHIPPED | OUTPATIENT
Start: 2018-07-21 | End: 2018-08-10

## 2018-07-21 RX ORDER — IPRATROPIUM BROMIDE AND ALBUTEROL SULFATE 2.5; .5 MG/3ML; MG/3ML
3 SOLUTION RESPIRATORY (INHALATION) ONCE
Status: DISCONTINUED | OUTPATIENT
Start: 2018-07-21 | End: 2018-07-21

## 2018-07-21 RX ORDER — ONDANSETRON 2 MG/ML
4 INJECTION INTRAMUSCULAR; INTRAVENOUS ONCE
Status: COMPLETED | OUTPATIENT
Start: 2018-07-21 | End: 2018-07-21

## 2018-07-21 RX ORDER — IPRATROPIUM BROMIDE AND ALBUTEROL SULFATE 2.5; .5 MG/3ML; MG/3ML
3 SOLUTION RESPIRATORY (INHALATION) ONCE AS NEEDED
Status: DISCONTINUED | OUTPATIENT
Start: 2018-07-21 | End: 2018-07-21

## 2018-07-21 RX ORDER — METHYLPREDNISOLONE SODIUM SUCCINATE 125 MG/2ML
125 INJECTION, POWDER, LYOPHILIZED, FOR SOLUTION INTRAMUSCULAR; INTRAVENOUS ONCE
Status: COMPLETED | OUTPATIENT
Start: 2018-07-21 | End: 2018-07-21

## 2018-07-21 RX ORDER — DIPHENHYDRAMINE HYDROCHLORIDE 50 MG/ML
50 INJECTION INTRAMUSCULAR; INTRAVENOUS ONCE
Status: COMPLETED | OUTPATIENT
Start: 2018-07-21 | End: 2018-07-21

## 2018-07-21 RX ORDER — METHYLPREDNISOLONE 4 MG/1
TABLET ORAL
Qty: 1 PACKAGE | Refills: 0 | Status: SHIPPED | OUTPATIENT
Start: 2018-07-21 | End: 2018-07-26

## 2018-07-21 NOTE — ED NOTES
Pt to CT at this time. Pt has tolerated previous CT scans. Pt states feeling much better. Audible wheezing no long heard.

## 2018-07-21 NOTE — ED INITIAL ASSESSMENT (HPI)
Pt presents to ER with asthma symptoms. Pt has been giving herself txs at home. Pt breathing between words. Skin warm and dry. Pt is a&ox3. No other complaints at this time. No CP. No n/v/d. No fever. Pt states that symptoms started to get bad last night.

## 2018-07-21 NOTE — ED PROVIDER NOTES
Patient Seen in: [facility] Emergency Department    History   Patient presents with:  Dyspnea MILY SOB (respiratory)    Stated Complaint: asthma    HPI    42-year-old female presents to the emergency department with complaints of difficulty breathing Temporal  SpO2: 97 %  O2 Device: None (Room air)    Current:/65   Pulse 85   Temp 98.1 °F (36.7 °C) (Temporal)   Resp 20   Wt 80.3 kg   SpO2 97%   BMI 30.87 kg/m²         Physical Exam   Constitutional: She is oriented to person, place, and time.  She PLATELET.   Procedure                               Abnormality         Status                     ---------                               -----------         ------                     CBC W/ DIFFERENTIAL[[id number]]          Abnormal            Final resul and slight stridor but there is no trismus no oral airway edema. She had an IV established she is placed on monitor she is normal sinus rhythm on the monitor she received a DuoNeb but also received Benadryl and Solu-Medrol.   She has some slight improvemen Normal, Disp-20 tablet, R-0

## 2018-07-21 NOTE — RESPIRATORY THERAPY NOTE
CALLED TO ER FOR A DUONEB FOR PT IN ROOM C2. WHEN I ARRIVED IN PT ROOM TO ASSESS NEB WAS ALREADY STARTED. LISTENED TO PT BS DIM  WITH GOOD AERATION BUT UPPER AIR  WAY STRIDOR. PEAK FLOW DONE . PT STATES NO CHANGE AFTER NEB.

## 2018-09-13 RX ORDER — LEVOCETIRIZINE DIHYDROCHLORIDE 5 MG/1
TABLET, FILM COATED ORAL
Qty: 90 TABLET | Refills: 1 | Status: SHIPPED | OUTPATIENT
Start: 2018-09-13 | End: 2018-11-28

## 2018-11-01 VITALS
RESPIRATION RATE: 16 BRPM | TEMPERATURE: 98.6 F | WEIGHT: 175 LBS | BODY MASS INDEX: 31.01 KG/M2 | HEIGHT: 63 IN | DIASTOLIC BLOOD PRESSURE: 78 MMHG | HEART RATE: 78 BPM | SYSTOLIC BLOOD PRESSURE: 126 MMHG

## 2018-11-02 VITALS
HEART RATE: 76 BPM | HEIGHT: 64 IN | TEMPERATURE: 98.1 F | DIASTOLIC BLOOD PRESSURE: 72 MMHG | RESPIRATION RATE: 16 BRPM | SYSTOLIC BLOOD PRESSURE: 126 MMHG | BODY MASS INDEX: 28.68 KG/M2 | WEIGHT: 168 LBS

## 2018-11-28 DIAGNOSIS — J45.21 MILD INTERMITTENT ASTHMA WITH EXACERBATION: ICD-10-CM

## 2018-11-28 RX ORDER — BUDESONIDE AND FORMOTEROL FUMARATE DIHYDRATE 160; 4.5 UG/1; UG/1
2 AEROSOL RESPIRATORY (INHALATION) 2 TIMES DAILY
Qty: 3 INHALER | Refills: 0 | Status: SHIPPED | OUTPATIENT
Start: 2018-11-28

## 2018-11-28 RX ORDER — FEXOFENADINE HCL 180 MG/1
180 TABLET ORAL DAILY
Qty: 90 TABLET | Refills: 0 | Status: SHIPPED | OUTPATIENT
Start: 2018-11-28

## 2018-11-28 NOTE — TELEPHONE ENCOUNTER
Spoke to patient. Patient requesting 3 month supply for the followin.      Medication Quantity Refills Last Filled     Budesonide-Formoterol Fumarate (SYMBICORT) 160-4.5 MCG/ACT Inhalation Aerosol 1 Inhaler 5 2018    Sig :  Inhale 2 puffs into t

## 2018-11-28 NOTE — TELEPHONE ENCOUNTER
Okay to refill Symbicort times 3 months. Recommend follow-up by February 2019.   No further refills til seen in office

## 2018-11-28 NOTE — TELEPHONE ENCOUNTER
Dr. Yadi Pastrana,    Please sign pending order for:    Symbicort 160-4.5 mcg QTY 3 w/ 0 Refill    Inhale 2 puffs into lungs 2 (two) times daily. Drink water after use. Allegra 180 mg QTY 90 tablets w/ 0 Refill    Take 1 tablet by mouth daily.     Patient states

## 2018-11-28 NOTE — TELEPHONE ENCOUNTER
Spoke to patient regarding following up by February 2019. She understands that no further refills will be sent until she is seen in office. Pt verbalizes her understanding to plan of care.

## 2018-11-28 NOTE — TELEPHONE ENCOUNTER
Per pt, needs to send her rx med to her new pharmacy on file verified and pt needs:  -Budesonide-Formoterol Fumarate (SYMBICORT)  -Allegra

## 2022-08-13 ENCOUNTER — HOSPITAL ENCOUNTER (EMERGENCY)
Facility: HOSPITAL | Age: 42
Discharge: HOME | End: 2022-08-13
Attending: EMERGENCY MEDICINE
Payer: OTHER GOVERNMENT

## 2022-08-13 ENCOUNTER — APPOINTMENT (OUTPATIENT)
Dept: GENERAL RADIOLOGY | Facility: HOSPITAL | Age: 42
End: 2022-08-13
Attending: EMERGENCY MEDICINE
Payer: OTHER GOVERNMENT

## 2022-08-13 VITALS
DIASTOLIC BLOOD PRESSURE: 79 MMHG | RESPIRATION RATE: 18 BRPM | OXYGEN SATURATION: 99 % | SYSTOLIC BLOOD PRESSURE: 130 MMHG | WEIGHT: 160 LBS | TEMPERATURE: 98 F | HEART RATE: 72 BPM | BODY MASS INDEX: 28 KG/M2

## 2022-08-13 DIAGNOSIS — M25.551 RIGHT HIP PAIN: Primary | ICD-10-CM

## 2022-08-13 LAB — B-HCG UR QL: NEGATIVE

## 2022-08-13 PROCEDURE — 99283 EMERGENCY DEPT VISIT LOW MDM: CPT

## 2022-08-13 PROCEDURE — 73502 X-RAY EXAM HIP UNI 2-3 VIEWS: CPT | Performed by: EMERGENCY MEDICINE

## 2022-08-13 PROCEDURE — 96372 THER/PROPH/DIAG INJ SC/IM: CPT

## 2022-08-13 PROCEDURE — 81025 URINE PREGNANCY TEST: CPT

## 2022-08-13 RX ORDER — KETOROLAC TROMETHAMINE 10 MG/1
10 TABLET, FILM COATED ORAL EVERY 6 HOURS PRN
Qty: 20 TABLET | Refills: 0 | Status: SHIPPED | OUTPATIENT
Start: 2022-08-13 | End: 2022-08-20

## 2022-08-13 RX ORDER — KETOROLAC TROMETHAMINE 30 MG/ML
30 INJECTION, SOLUTION INTRAMUSCULAR; INTRAVENOUS ONCE
Status: COMPLETED | OUTPATIENT
Start: 2022-08-13 | End: 2022-08-13

## 2022-08-13 NOTE — ED QUICK NOTES
Pt A&OX4, pt denies dizziness/lightheadedness, cp, sob, n/v. Discharge paperwork, prescription and follow-up discussed with pt, pt verbally understands them. Pt discharged ambulatory with steady gait - no distress noted.

## 2022-09-17 ENCOUNTER — HOSPITAL ENCOUNTER (EMERGENCY)
Facility: HOSPITAL | Age: 42
Discharge: HOME | End: 2022-09-18
Attending: EMERGENCY MEDICINE

## 2022-09-17 DIAGNOSIS — S60.212A CONTUSION OF LEFT WRIST, INITIAL ENCOUNTER: Primary | ICD-10-CM

## 2022-09-17 PROCEDURE — 99283 EMERGENCY DEPT VISIT LOW MDM: CPT

## 2022-09-18 ENCOUNTER — APPOINTMENT (OUTPATIENT)
Dept: GENERAL RADIOLOGY | Facility: HOSPITAL | Age: 42
End: 2022-09-18
Attending: EMERGENCY MEDICINE

## 2022-09-18 VITALS
WEIGHT: 160 LBS | RESPIRATION RATE: 16 BRPM | HEART RATE: 58 BPM | SYSTOLIC BLOOD PRESSURE: 160 MMHG | DIASTOLIC BLOOD PRESSURE: 93 MMHG | BODY MASS INDEX: 28.35 KG/M2 | TEMPERATURE: 98 F | OXYGEN SATURATION: 99 % | HEIGHT: 63 IN

## 2022-09-18 PROCEDURE — 73110 X-RAY EXAM OF WRIST: CPT | Performed by: EMERGENCY MEDICINE

## 2022-09-18 RX ORDER — IBUPROFEN 600 MG/1
600 TABLET ORAL ONCE
Status: COMPLETED | OUTPATIENT
Start: 2022-09-18 | End: 2022-09-18

## 2022-09-18 NOTE — ED QUICK NOTES
Pt states while at work today conveyor belt with shelves slammed in left wrist multiple times. Per pt can move fingers and wrist but certain movements causes discomfort. Radial pulse present. Pt denies all other complaints at the moment.

## 2022-09-18 NOTE — ED QUICK NOTES
Pt discharged in stable condition. Discharge instructions and follow up care reviewed bedside with pt. Pt denies any questions or concerns.   Steady gait

## 2022-09-20 ENCOUNTER — APPOINTMENT (OUTPATIENT)
Dept: OTHER | Facility: HOSPITAL | Age: 42
End: 2022-09-20
Attending: PREVENTIVE MEDICINE

## 2022-09-27 ENCOUNTER — APPOINTMENT (OUTPATIENT)
Dept: OTHER | Facility: HOSPITAL | Age: 42
End: 2022-09-27
Attending: EMERGENCY MEDICINE

## 2022-11-03 ENCOUNTER — HOSPITAL ENCOUNTER (OUTPATIENT)
Age: 42
Discharge: HOME | End: 2022-11-03
Payer: COMMERCIAL

## 2022-11-03 VITALS
RESPIRATION RATE: 16 BRPM | HEART RATE: 77 BPM | SYSTOLIC BLOOD PRESSURE: 118 MMHG | TEMPERATURE: 99 F | OXYGEN SATURATION: 100 % | DIASTOLIC BLOOD PRESSURE: 69 MMHG

## 2022-11-03 DIAGNOSIS — Z20.822 LAB TEST NEGATIVE FOR COVID-19 VIRUS: ICD-10-CM

## 2022-11-03 DIAGNOSIS — Z20.822 ENCOUNTER FOR SCREENING LABORATORY TESTING FOR COVID-19 VIRUS: ICD-10-CM

## 2022-11-03 DIAGNOSIS — J06.9 VIRAL UPPER RESPIRATORY TRACT INFECTION: Primary | ICD-10-CM

## 2022-11-03 DIAGNOSIS — H92.02 LEFT EAR PAIN: ICD-10-CM

## 2022-11-03 LAB — SARS-COV-2 RNA RESP QL NAA+PROBE: NOT DETECTED

## 2022-11-03 PROCEDURE — U0002 COVID-19 LAB TEST NON-CDC: HCPCS | Performed by: NURSE PRACTITIONER

## 2022-11-03 PROCEDURE — 99203 OFFICE O/P NEW LOW 30 MIN: CPT | Performed by: NURSE PRACTITIONER

## 2022-11-03 RX ORDER — ESCITALOPRAM OXALATE 20 MG/1
20 TABLET ORAL DAILY
COMMUNITY
Start: 2022-10-19 | End: 2023-01-17

## 2022-11-03 RX ORDER — QUETIAPINE FUMARATE 50 MG/1
TABLET, EXTENDED RELEASE ORAL
COMMUNITY
Start: 2022-08-25

## 2022-11-03 RX ORDER — BUPROPION HYDROCHLORIDE 75 MG/1
75 TABLET ORAL 2 TIMES DAILY
COMMUNITY
Start: 2020-09-29

## 2022-11-03 NOTE — DISCHARGE INSTRUCTIONS
Continue your Allegra and add in Flonase nasal spray take ibuprofen or Tylenol for pain or discomfort. Drink warm tea with honey. Gargle with salt water 2-3 times per day and spit it out. Follow-up with your primary care provider if symptoms persist or worsen.

## 2023-01-01 ENCOUNTER — HOSPITAL ENCOUNTER (OUTPATIENT)
Age: 43
Discharge: HOME | End: 2023-01-01
Payer: COMMERCIAL

## 2023-01-01 VITALS
WEIGHT: 170 LBS | BODY MASS INDEX: 30.12 KG/M2 | OXYGEN SATURATION: 99 % | HEIGHT: 63 IN | TEMPERATURE: 98 F | DIASTOLIC BLOOD PRESSURE: 78 MMHG | SYSTOLIC BLOOD PRESSURE: 127 MMHG | RESPIRATION RATE: 18 BRPM | HEART RATE: 79 BPM

## 2023-01-01 DIAGNOSIS — J98.8 VIRAL RESPIRATORY ILLNESS: Primary | ICD-10-CM

## 2023-01-01 DIAGNOSIS — B97.89 VIRAL RESPIRATORY ILLNESS: Primary | ICD-10-CM

## 2023-01-01 LAB
POCT INFLUENZA A: NEGATIVE
POCT INFLUENZA B: NEGATIVE
SARS-COV-2 RNA RESP QL NAA+PROBE: NOT DETECTED

## 2023-01-01 RX ORDER — IBUPROFEN 600 MG/1
600 TABLET ORAL EVERY 6 HOURS PRN
Qty: 20 TABLET | Refills: 0 | Status: SHIPPED | OUTPATIENT
Start: 2023-01-01 | End: 2023-01-08

## 2023-01-01 RX ORDER — BENZONATATE 100 MG/1
100 CAPSULE ORAL 3 TIMES DAILY PRN
Qty: 20 CAPSULE | Refills: 0 | Status: SHIPPED | OUTPATIENT
Start: 2023-01-01 | End: 2023-01-08

## 2023-01-01 NOTE — ED INITIAL ASSESSMENT (HPI)
Pt presents to the IC with c/o a sore throat, nasal congestion, chest congestion, cough, body aches and chills since Thursday, worse yesterday. unknown if she has fevers.

## 2023-01-06 ENCOUNTER — HOSPITAL ENCOUNTER (OUTPATIENT)
Age: 43
Discharge: HOME | End: 2023-01-06
Payer: COMMERCIAL

## 2023-01-06 ENCOUNTER — APPOINTMENT (OUTPATIENT)
Dept: GENERAL RADIOLOGY | Age: 43
End: 2023-01-06
Attending: NURSE PRACTITIONER
Payer: COMMERCIAL

## 2023-01-06 VITALS
SYSTOLIC BLOOD PRESSURE: 130 MMHG | RESPIRATION RATE: 19 BRPM | HEART RATE: 75 BPM | TEMPERATURE: 99 F | DIASTOLIC BLOOD PRESSURE: 80 MMHG | OXYGEN SATURATION: 99 %

## 2023-01-06 DIAGNOSIS — J01.90 ACUTE SINUSITIS, RECURRENCE NOT SPECIFIED, UNSPECIFIED LOCATION: Primary | ICD-10-CM

## 2023-01-06 PROCEDURE — 71046 X-RAY EXAM CHEST 2 VIEWS: CPT | Performed by: NURSE PRACTITIONER

## 2023-01-06 PROCEDURE — 99213 OFFICE O/P EST LOW 20 MIN: CPT | Performed by: NURSE PRACTITIONER

## 2023-01-06 RX ORDER — ALBUTEROL SULFATE 90 UG/1
2 AEROSOL, METERED RESPIRATORY (INHALATION) EVERY 4 HOURS PRN
Qty: 1 EACH | Refills: 0 | Status: SHIPPED | OUTPATIENT
Start: 2023-01-06 | End: 2023-02-05

## 2023-01-06 RX ORDER — AMOXICILLIN AND CLAVULANATE POTASSIUM 875; 125 MG/1; MG/1
1 TABLET, FILM COATED ORAL 2 TIMES DAILY
Qty: 20 TABLET | Refills: 0 | Status: SHIPPED | OUTPATIENT
Start: 2023-01-06 | End: 2023-01-16

## 2023-01-06 RX ORDER — ONDANSETRON 4 MG/1
4 TABLET, ORALLY DISINTEGRATING ORAL EVERY 8 HOURS PRN
Qty: 10 TABLET | Refills: 0 | Status: SHIPPED | OUTPATIENT
Start: 2023-01-06 | End: 2023-01-13

## 2023-01-06 RX ORDER — BENZONATATE 100 MG/1
200 CAPSULE ORAL 3 TIMES DAILY PRN
Qty: 30 CAPSULE | Refills: 0 | Status: SHIPPED | OUTPATIENT
Start: 2023-01-06 | End: 2023-02-05

## 2023-01-06 NOTE — ED INITIAL ASSESSMENT (HPI)
Patient is here with a cough for the last week. She states she was here on the first of the month and had all the testing done which was negative.

## 2023-01-06 NOTE — DISCHARGE INSTRUCTIONS
Drink plenty of fluids. Rest.  Take the prescribed occasions as directed. Follow-up with your doctor. Return for any concerns.

## 2023-06-30 ENCOUNTER — HOSPITAL ENCOUNTER (OUTPATIENT)
Age: 43
Discharge: HOME | End: 2023-06-30
Payer: COMMERCIAL

## 2023-06-30 VITALS
HEART RATE: 71 BPM | TEMPERATURE: 98 F | SYSTOLIC BLOOD PRESSURE: 120 MMHG | OXYGEN SATURATION: 99 % | DIASTOLIC BLOOD PRESSURE: 78 MMHG | RESPIRATION RATE: 16 BRPM

## 2023-06-30 DIAGNOSIS — J45.901 ASTHMA EXACERBATION, MILD: ICD-10-CM

## 2023-06-30 DIAGNOSIS — J06.9 VIRAL URI: Primary | ICD-10-CM

## 2023-06-30 LAB — S PYO AG THROAT QL: NEGATIVE

## 2023-06-30 PROCEDURE — 99213 OFFICE O/P EST LOW 20 MIN: CPT | Performed by: NURSE PRACTITIONER

## 2023-06-30 PROCEDURE — 87880 STREP A ASSAY W/OPTIC: CPT | Performed by: NURSE PRACTITIONER

## 2023-06-30 PROCEDURE — E0570 NEBULIZER WITH COMPRESSION: HCPCS | Performed by: NURSE PRACTITIONER

## 2023-06-30 RX ORDER — IPRATROPIUM BROMIDE AND ALBUTEROL SULFATE 2.5; .5 MG/3ML; MG/3ML
3 SOLUTION RESPIRATORY (INHALATION) EVERY 4 HOURS PRN
Qty: 30 EACH | Refills: 0 | Status: SHIPPED | OUTPATIENT
Start: 2023-06-30 | End: 2023-07-30

## 2023-08-01 ENCOUNTER — OFFICE VISIT (OUTPATIENT)
Dept: OTOLARYNGOLOGY | Facility: CLINIC | Age: 43
End: 2023-08-01

## 2023-08-01 VITALS — TEMPERATURE: 97 F | BODY MASS INDEX: 30.12 KG/M2 | HEIGHT: 63 IN | WEIGHT: 170 LBS

## 2023-08-01 DIAGNOSIS — J34.89 NASAL OBSTRUCTION: ICD-10-CM

## 2023-08-01 DIAGNOSIS — J30.9 CHRONIC ALLERGIC RHINITIS: ICD-10-CM

## 2023-08-01 DIAGNOSIS — J34.2 NASAL SEPTAL DEVIATION: Primary | ICD-10-CM

## 2023-08-01 PROCEDURE — 3008F BODY MASS INDEX DOCD: CPT | Performed by: STUDENT IN AN ORGANIZED HEALTH CARE EDUCATION/TRAINING PROGRAM

## 2023-08-01 PROCEDURE — 99204 OFFICE O/P NEW MOD 45 MIN: CPT | Performed by: STUDENT IN AN ORGANIZED HEALTH CARE EDUCATION/TRAINING PROGRAM

## 2023-08-01 RX ORDER — SUMATRIPTAN 100 MG/1
100 TABLET, FILM COATED ORAL
COMMUNITY
Start: 2023-06-26

## 2023-08-01 RX ORDER — ESCITALOPRAM OXALATE 20 MG/1
20 TABLET ORAL DAILY
Qty: 30 TABLET | Refills: 2 | COMMUNITY
Start: 2023-07-11 | End: 2023-10-09

## 2023-08-01 RX ORDER — AZELASTINE 1 MG/ML
2 SPRAY, METERED NASAL 2 TIMES DAILY
Qty: 90 ML | Refills: 0 | OUTPATIENT
Start: 2023-08-01

## 2023-08-01 RX ORDER — AZELASTINE 1 MG/ML
2 SPRAY, METERED NASAL 2 TIMES DAILY
Qty: 30 ML | Refills: 0 | Status: SHIPPED | OUTPATIENT
Start: 2023-08-01

## 2023-08-14 ENCOUNTER — HOSPITAL ENCOUNTER (OUTPATIENT)
Dept: CT IMAGING | Facility: HOSPITAL | Age: 43
Discharge: HOME | End: 2023-08-14
Attending: STUDENT IN AN ORGANIZED HEALTH CARE EDUCATION/TRAINING PROGRAM
Payer: COMMERCIAL

## 2023-08-14 ENCOUNTER — NURSE ONLY (OUTPATIENT)
Dept: ALLERGY | Facility: CLINIC | Age: 43
End: 2023-08-14

## 2023-08-14 ENCOUNTER — LAB ENCOUNTER (OUTPATIENT)
Dept: LAB | Facility: HOSPITAL | Age: 43
End: 2023-08-14
Attending: STUDENT IN AN ORGANIZED HEALTH CARE EDUCATION/TRAINING PROGRAM
Payer: COMMERCIAL

## 2023-08-14 ENCOUNTER — OFFICE VISIT (OUTPATIENT)
Dept: ALLERGY | Facility: CLINIC | Age: 43
End: 2023-08-14

## 2023-08-14 VITALS — DIASTOLIC BLOOD PRESSURE: 76 MMHG | HEART RATE: 74 BPM | OXYGEN SATURATION: 98 % | SYSTOLIC BLOOD PRESSURE: 120 MMHG

## 2023-08-14 DIAGNOSIS — Z91.018 FOOD ALLERGY: ICD-10-CM

## 2023-08-14 DIAGNOSIS — J30.2 SEASONAL AND PERENNIAL ALLERGIC RHINOCONJUNCTIVITIS: ICD-10-CM

## 2023-08-14 DIAGNOSIS — J30.89 ENVIRONMENTAL AND SEASONAL ALLERGIES: Primary | ICD-10-CM

## 2023-08-14 DIAGNOSIS — H10.10 SEASONAL AND PERENNIAL ALLERGIC RHINOCONJUNCTIVITIS: ICD-10-CM

## 2023-08-14 DIAGNOSIS — J34.89 NASAL OBSTRUCTION: ICD-10-CM

## 2023-08-14 DIAGNOSIS — J45.20 MILD INTERMITTENT EXTRINSIC ASTHMA WITHOUT COMPLICATION: Primary | ICD-10-CM

## 2023-08-14 DIAGNOSIS — J34.2 NASAL SEPTAL DEVIATION: ICD-10-CM

## 2023-08-14 DIAGNOSIS — Z23 FLU VACCINE NEED: ICD-10-CM

## 2023-08-14 DIAGNOSIS — Z23 NEED FOR VACCINATION AGAINST STREPTOCOCCUS PNEUMONIAE USING PNEUMOCOCCAL CONJUGATE VACCINE 7: ICD-10-CM

## 2023-08-14 DIAGNOSIS — Z23 NEED FOR PNEUMOCOCCAL VACCINE: ICD-10-CM

## 2023-08-14 DIAGNOSIS — J30.89 SEASONAL AND PERENNIAL ALLERGIC RHINOCONJUNCTIVITIS: ICD-10-CM

## 2023-08-14 PROCEDURE — 36415 COLL VENOUS BLD VENIPUNCTURE: CPT

## 2023-08-14 PROCEDURE — 86003 ALLG SPEC IGE CRUDE XTRC EA: CPT

## 2023-08-14 PROCEDURE — 99204 OFFICE O/P NEW MOD 45 MIN: CPT | Performed by: ALLERGY & IMMUNOLOGY

## 2023-08-14 PROCEDURE — 90677 PCV20 VACCINE IM: CPT | Performed by: ALLERGY & IMMUNOLOGY

## 2023-08-14 PROCEDURE — 3078F DIAST BP <80 MM HG: CPT | Performed by: ALLERGY & IMMUNOLOGY

## 2023-08-14 PROCEDURE — 95024 IQ TESTS W/ALLERGENIC XTRCS: CPT | Performed by: ALLERGY & IMMUNOLOGY

## 2023-08-14 PROCEDURE — 95004 PERQ TESTS W/ALRGNC XTRCS: CPT | Performed by: ALLERGY & IMMUNOLOGY

## 2023-08-14 PROCEDURE — 90471 IMMUNIZATION ADMIN: CPT | Performed by: ALLERGY & IMMUNOLOGY

## 2023-08-14 PROCEDURE — 3074F SYST BP LT 130 MM HG: CPT | Performed by: ALLERGY & IMMUNOLOGY

## 2023-08-14 PROCEDURE — 70486 CT MAXILLOFACIAL W/O DYE: CPT | Performed by: STUDENT IN AN ORGANIZED HEALTH CARE EDUCATION/TRAINING PROGRAM

## 2023-08-14 RX ORDER — MONTELUKAST SODIUM 10 MG/1
10 TABLET ORAL NIGHTLY
Qty: 90 TABLET | Refills: 0 | Status: SHIPPED | OUTPATIENT
Start: 2023-08-14

## 2023-08-14 RX ORDER — MONTELUKAST SODIUM 10 MG/1
10 TABLET ORAL NIGHTLY
Qty: 30 TABLET | Refills: 0 | Status: SHIPPED | OUTPATIENT
Start: 2023-08-14 | End: 2023-08-14

## 2023-08-14 RX ORDER — QUETIAPINE 400 MG/1
TABLET, FILM COATED, EXTENDED RELEASE ORAL
COMMUNITY
Start: 2022-10-17

## 2023-08-14 RX ORDER — ATOMOXETINE 10 MG/1
CAPSULE ORAL
COMMUNITY
Start: 2023-07-11

## 2023-08-14 NOTE — PROGRESS NOTES
Hanane Trujillo is a 37year old female. HPI:   Patient presents with: Allergies: Follow-up appointment. Here for retesting for seasonal/environmental. Normally takes allegra. No antihistamines for the last 5 days  Food Allergy: Worried about food allergies to pecan, pineapple, and shellfish. With pecans patient's face became flushed and itchy. With pineapple had swollen tongue. Shellfish causes itchy throat sometimes. Used to peanut allergy but is now eating and tolerating food    Patient is a 45-year-old female who presents for allergy evaluation with a chief complaint of allergies  Review of records show patient last seen by me in June 2018 over 5 years ago  At last visit patient had a history of shellfish allergy, hymenoptera allergy allergic rhinitis and asthma  Prior serum IgE testing showed sensitization to grass. Prior skin testing was positive to trees ragweed weeds dust mite cats and dogs    Moved back to [facility]         allergies  Duration: years   Timing:   YR + seasonal  summer   Symptoms: nc, pnd, rn, sz   Severity: moderate   Status: worsening in [place]  x 1 year   Triggers: cats  Tried: allegra ,  astelin,   Prior zyrtec, xyzal, singulair   Sees ent : + nsd ,  ct sinus today of sinuses, dr Javier Minaya or smokers:  GF with cats in [facility]. 5 dogs   Nonsmoker   Prior ait at 13 yo     Hx of asthma, ad, or food allergy:    Food Allergy: Worried about food allergies to pecan, pineapple, and shellfish. With pecans patient's face became flushed and itchy. With pineapple had swollen tongue. Shellfish causes itchy throat sometimes. Used to peanut allergy per patient but is now eating and tolerating food  Ok with walnut     No COVID vaccines listed in EMR. Utd per pt    Pneumonia vaccine up-to-date  Prior xolair? Off now.  Asthma stable  now   Med: albuterol   Neb at home       HISTORY:  Past Medical History:   Diagnosis Date    Anxiety     Arrhythmia     CERVICAL DYSPLASIA 7/2011 Depression     Heart murmur     Hemorrhoid     IBS (irritable bowel syndrome)     Other and unspecified hyperlipidemia     Parotid swelling     Recurrent sinusitis     Seasonal allergies       Past Surgical History:   Procedure Laterality Date    D & C      HX PAROTIDECTOMY  12/2016    parotid gland removed in December 2016    [address]    wisdom teeth removed    SINUS SURGERY    2009    Deviated septum repair    TONSILLECTOMY  1996      Family History   Problem Relation Age of Onset    Thyroid Disorder Mother     High Blood Pressure Mother     Gastro-Intestinal Disorder Mother         gallbladder issues    Diabetes Maternal Grandfather     Asthma Sister     Gastro-Intestinal Disorder Brother         gall bladder      Social History:   Social History     Socioeconomic History    Marital status:     Number of children: 0   Occupational History    Occupation:    Tobacco Use    Smoking status: Never    Smokeless tobacco: Never   Substance and Sexual Activity    Alcohol use: Yes     Comment: socially    Drug use: No    Sexual activity: Not Currently     Partners: Female     Birth control/protection: OCP   Social History Narrative    . Works as a . Social alcohol use. Non-smoker. No structured exercise. No children. Has life partner        Medications (Active prior to today's visit):  Current Outpatient Medications   Medication Sig Dispense Refill    QUEtiapine  MG Oral Tablet 24 Hr       atomoxetine 10 MG Oral Cap Take 10 mg in the morning      montelukast (SINGULAIR) 10 MG Oral Tab Take 1 tablet (10 mg total) by mouth nightly. 30 tablet 0    escitalopram 20 MG Oral Tab Take 1 tablet (20 mg total) by mouth daily. 30 tablet 2    verapamil  MG Oral Tab CR Take 1 tablet (180 mg total) by mouth daily. buPROPion 75 MG Oral Tab Take 1 tablet (75 mg total) by mouth 2 (two) times daily.  Patient takes different 150 mg daily      Multiple Vitamins-Minerals (TAB-A-SHAYLA MAXIMUM) Oral Tab Take 1 tablet by mouth daily. SUMAtriptan Succinate 100 MG Oral Tab 1 tablet (100 mg total). (Patient not taking: Reported on 8/14/2023)      azelastine 0.1 % Nasal Solution 2 sprays by Nasal route 2 (two) times daily. (Patient not taking: Reported on 8/14/2023) 30 mL 0    QUEtiapine ER 50 MG Oral Tablet 24 Hr       Fenofibrate 145 MG Oral Tab Take 1 tablet (145 mg total) by mouth daily. (Patient not taking: Reported on 11/3/2022) 90 tablet 0    atorvastatin 20 MG Oral Tab Take 1 tablet (20 mg total) by mouth daily. 90 tablet 0    Norethindrone-Eth Estradiol (ALYACEN 1/35) 1-35 MG-MCG Oral Tab Take 1 tablet by mouth daily. (Patient not taking: Reported on 11/3/2022) 3 Package 0    Tiotropium Bromide Monohydrate (SPIRIVA RESPIMAT) 1.25 MCG/ACT Inhalation Aero Soln Inhale 2 sprays into the lungs daily. (Patient not taking: Reported on 11/3/2022) 3 Inhaler 2    Budesonide-Formoterol Fumarate (SYMBICORT) 160-4.5 MCG/ACT Inhalation Aerosol Inhale 2 puffs into the lungs 2 (two) times daily. Drink water after use. (Patient not taking: Reported on 11/3/2022) 3 Inhaler 0    Fexofenadine HCl 180 MG Oral Tab Take 1 tablet (180 mg total) by mouth daily. (Patient not taking: Reported on 8/14/2023) 90 tablet 0    nystatin 617399 UNIT/ML Mouth/Throat Suspension Take 5 mL (500,000 Units total) by mouth 4 (four) times daily. Swish and spit x 2 weeks (Patient not taking: Reported on 11/3/2022) 300 mL 6    EPINEPHrine (EPIPEN 2-BRETT) 0.3 MG/0.3ML Injection Solution Auto-injector Inject IM in event of  allergic reaction 1 each 0    Vitamin C 500 MG Oral Tab Take 500 mg by mouth daily. (Patient not taking: Reported on 11/3/2022)      DiphenhydrAMINE HCl 25 MG Oral Tab Take 1 tablet (25 mg total) by mouth every 6 (six) hours as needed for Itching or Allergies.  (Patient not taking: Reported on 8/14/2023)      ferrous sulfate 325 (65 FE) MG Oral Tab EC Take 1 tablet (325 mg total) by mouth daily with breakfast. (Patient not taking: Reported on 8/14/2023)      albuterol sulfate (2.5 MG/3ML) 0.083% Inhalation Nebu Soln Take 3 mL (2.5 mg total) by nebulization every 4 (four) hours as needed for Wheezing. (Patient not taking: Reported on 8/14/2023) 30 ampule 0    ClonazePAM 0.5 MG Oral Tab Take 2 tablets (1 mg total) by mouth nightly as needed. (Patient not taking: Reported on 8/14/2023)      omega-3 fatty acids 1000 MG Oral Cap Take 1,000 mg by mouth daily. (Patient not taking: Reported on 11/3/2022)      QUEtiapine Fumarate (SEROQUEL) 200 MG Oral Tab Take 300 mg by mouth nightly.    (Patient not taking: Reported on 11/3/2022)         Allergies:    Dust                    SHORTNESS OF BREATH  Pollen                  SHORTNESS OF BREATH  Seasonal                SHORTNESS OF BREATH  Latex                   HIVES  Neosporin [Neomycin*    RASH    Comment:Blisters  Shellfish-Derived P*          ROS:     Allergic/Immuno:  See HPI  Cardiovascular:  Negative for irregular heartbeat/palpitations, chest pain, edema  Constitutional:  Negative night sweats,weight loss, irritability and lethargy  Endocrine:  Negative for cold intolerance, polydipsia and polyphagia  ENMT:  Negative for ear drainage, hearing loss  see hpi   Eyes:  Negative for eye discharge and vision loss  Gastrointestinal:  Negative for abdominal pain, diarrhea and vomiting  Genitourinary:  Negative for dysuria and hematuria  Hema/Lymph:  Negative for easy bleeding and easy bruising  Integumentary:  Negative for pruritus and rash  Musculoskeletal:  Negative for joint symptoms  Neurological:  Negative for dizziness, seizures  Psychiatric:  Negative for inappropriate interaction and psychiatric symptoms  Respiratory:  Negative for cough, dyspnea and wheezing      PHYSICAL EXAM:   Constitutional: responsive, no acute distress noted  Head/Face: NC/Atraumatic  Eyes/Vision: conjunctiva and lids are normal extraocular motion is intact   Ears/Audiometry: tympanic membranes are normal bilaterally hearing is grossly intact  Nose/Mouth/Throat: nose and throat are clear mucous membranes are moist   Neck/Thyroid: neck is supple without adenopathy  Lymphatic: no abnormal cervical, supraclavicular or axillary adenopathy is noted  Respiratory: normal to inspection lungs are clear to auscultation bilaterally normal respiratory effort   Cardiovascular: regular rate and rhythm no murmurs, gallups, or rubs  Abdomen: soft non-tender non-distended  Skin/Hair: no unusual rashes present  Extremities: no edema, cyanosis, or clubbing  Neurological:Oriented to time, place, person & situation       ASSESSMENT/PLAN:   Assessment   Mild intermittent extrinsic asthma without complication  (primary encounter diagnosis)  Seasonal and perennial allergic rhinoconjunctivitis  Nasal septal deviation  Food allergy  Need for vaccination against streptococcus pneumoniae using pneumococcal conjugate vaccine 7  Flu vaccine need    Unable to perform spirometry due to COVID-19 pandemic     skin testing today to common indoor and outdoor environmental allergies was + to rw weeds dust mite cat dog    Skin testing today to select foods including shellfish pineapple and pecan was negative     Positive histamine control      #1 asthma  Denies current symptoms more than 2 days/week. No prednisone over the past year. Prior to [address] in the past last was a few years ago. Continue with albuterol 2 puffs every 4-6 hours as needed  Patient will also be on Singulair for underlying environmental allergies which may also prevent asthma symptoms. Reviewed signs and symptoms of persistent asthma including 2    #2 allergic rhinitis  See above skin testing to screen for allergic triggers  Reviewed avoidance measures and potential treatment option immunotherapy  Continue with Allegra and Astelin  Trial of Singulair    #3 nasal septal deviation  Followed by ENT. CT of sinuses performed earlier today.   Follow-up with ENT as scheduled    #4 Food allergies  See above skin testing to shellfish pecan and pineapple. Recommend avoid those foods are positive on skin testing  EpiPen and Benadryl as needed based on symptom severity per Food allergy action plan  May consider oral challenge) are negative on skin testing  Check serum IgE to shellfish Pecan and pineapple given her negative skin testing. #5 pneumonia vaccine with Prevnar 20 recommended given history of asthma             Orders This Visit:  Orders Placed This Encounter      Pecan Nut      Pineapple      Shrimp      Crab      Lobster      Scallop      Allergen, Oyster      Clam      Prevnar 20 (PCV20) [24536]      Meds This Visit:  Requested Prescriptions     Signed Prescriptions Disp Refills    montelukast (SINGULAIR) 10 MG Oral Tab 30 tablet 0     Sig: Take 1 tablet (10 mg total) by mouth nightly. Imaging & Referrals:  PCV20 VACCINE FOR INTRAMUSCULAR USE     8/14/2023  Lino Nascimento MD      If medication samples were provided today, they were provided solely for patient education and training related to self administration of these medications. Teaching, instruction and sample was provided to the patient by myself. Teaching included  a review of potential adverse side effects as well as potential efficacy. Patient's questions were answered in regards to medication administration and dosing and potential side effects.  Teaching was provided via the teach back method

## 2023-08-14 NOTE — PATIENT INSTRUCTIONS
#1 asthma  Denies current symptoms more than 2 days/week. No prednisone over the past year. Prior to [address] in the past last was a few years ago. Continue with albuterol 2 puffs every 4-6 hours as needed  Patient will also be on Singulair for underlying environmental allergies which may also prevent asthma symptoms. Reviewed signs and symptoms of persistent asthma including 2    #2 allergic rhinitis  See above skin testing to screen for allergic triggers  Reviewed avoidance measures and potential treatment option immunotherapy  Continue with Allegra and Astelin  Trial of Singulair    #3 nasal septal deviation  Followed by ENT. CT of sinuses performed earlier today. Follow-up with ENT as scheduled    #4 Food allergies  See above skin testing to shellfish pecan and pineapple. Recommend avoid those foods are positive on skin testing  EpiPen and Benadryl as needed based on symptom severity per Food allergy action plan  May consider oral challenge) are negative on skin testing    #5 pneumonia vaccine with Prevnar 20 recommended given history of asthma             Orders This Visit:  Orders Placed This Encounter      Prevnar 20 [phone]      Meds This Visit:  Requested Prescriptions     Signed Prescriptions Disp Refills    montelukast (SINGULAIR) 10 MG Oral Tab 30 tablet 0     Sig: Take 1 tablet (10 mg total) by mouth nightly.

## 2023-08-14 NOTE — TELEPHONE ENCOUNTER
Refill requested for   Name from pharmacy: MONTELUKAST 10MG TABLETS          Will file in chart as: MONTELUKAST 10 MG Oral Tab     Possible duplicate: Demetrice to review recent actions on this medication    Sig: TAKE 1 TABLET(10 MG) BY MOUTH EVERY NIGHT    Disp: 90 tablet    Refills: 0 (Pharmacy requested: Not specified)    Start: 8/14/2023    Class: Normal    To pharmacy: **Patient requests 90 days supply**    Last ordered: Today by Silvina Funez MD Last refill: 8/14/2023    Rx #: [id number]    Corticosteroids / Long-Acting Bronchodilators Ksyupr1508/14/2023 05:10 PM   Protocol Details Appt in past 6 mos or next 3 mos      To be filled at: [address], [phone], [phone]     Last office visit: 08/14/2023    Previously advised to follow up in Asthma on a daily controller. Six month     F/U currently scheduled?  Not at this time     ACTION: Refilled per protocol

## 2023-08-15 ENCOUNTER — OFFICE VISIT (OUTPATIENT)
Dept: OTOLARYNGOLOGY | Facility: CLINIC | Age: 43
End: 2023-08-15

## 2023-08-15 DIAGNOSIS — J34.2 NASAL SEPTAL DEVIATION: ICD-10-CM

## 2023-08-15 DIAGNOSIS — J32.9 CHRONIC SINUSITIS, UNSPECIFIED LOCATION: Primary | ICD-10-CM

## 2023-08-15 DIAGNOSIS — J34.3 HYPERTROPHY OF BOTH INFERIOR NASAL TURBINATES: ICD-10-CM

## 2023-08-15 DIAGNOSIS — J34.89 NASAL OBSTRUCTION: ICD-10-CM

## 2023-08-15 DIAGNOSIS — J30.9 CHRONIC ALLERGIC RHINITIS: ICD-10-CM

## 2023-08-15 LAB
CLAM IGE QN: <0.1 KUA/L (ref ?–0.1)
CRAB IGE QN: <0.1 KUA/L (ref ?–0.1)
LOBSTER IGE QN: <0.1 KUA/L (ref ?–0.1)
PECAN/HICK NUT IGE QN: <0.1 KUA/L (ref ?–0.1)
SCALLOP IGE QN: <0.1 KUA/L (ref ?–0.1)
SHRIMP IGE QN: 0.13 KUA/L (ref ?–0.1)

## 2023-08-15 PROCEDURE — 99214 OFFICE O/P EST MOD 30 MIN: CPT | Performed by: STUDENT IN AN ORGANIZED HEALTH CARE EDUCATION/TRAINING PROGRAM

## 2023-08-15 RX ORDER — AMOXICILLIN AND CLAVULANATE POTASSIUM 875; 125 MG/1; MG/1
1 TABLET, FILM COATED ORAL EVERY 12 HOURS
Qty: 14 TABLET | Refills: 0 | Status: SHIPPED | OUTPATIENT
Start: 2023-08-15 | End: 2023-08-22

## 2023-08-15 NOTE — PROGRESS NOTES
Jaqueline Hughes is a 37year old female. Patient presents with: Follow - Up: Reviewing CT scan. ASSESSMENT AND PLAN:   1. Chronic sinusitis, unspecified location  42-year-old she who presents in follow-up to review CT scan. CT does demonstrate sinusitis in her maxillary frontal and sphenoid and ethmoid sinuses on both sides. She reports chronic facial pressure nasal drainage and congestion. She has not tried antibiotics in this past year I think that this is a reasonable next step. She did try the Astelin nasal spray that did result in some improvement recently although for about several days after the last visit she cannot breathe through her nose at all. She did see the allergist and tested positive to several environmental allergens. Was started on Singulair. Do suspect that there is an allergic component to her issue. On exam her septum has a deviation to the right with inferior turbinate hypertrophy on both sides. Discussed that she would be a candidate for septoplasty and turbinate reduction to improve her tolerance of her allergies although that her allergies will still be present may still have some congestion even after a procedure. With regards to her sinuses we will trial a course of oral antibiotics to see if this helps her facial pressure congestion and drainage. If not she would also be a candidate for endoscopic sinus surgery concurrently with the septoplasty and turbinate reduction. I explained the surgery in detail with her. This procedure was discussed including risks, benefits and alternatives. Risks include bleeding, infection, orbital injury including blindness, CSF leak including meningitis, scarring, need for post operative debridement and irrigation, risks of general anesthesia, need for additional procedures and persistent symptoms. The patient is going to try the oral antibiotic and call back if still symptomatic if so we will arrange surgery at that time.     2. Nasal septal deviation      3. Chronic allergic rhinitis      4. Nasal obstruction      5. Hypertrophy of both inferior nasal turbinates        The patient indicates understanding of these issues and agrees to the plan. EXAM:   [facility] 06/01/2023 (Approximate)     Pertinent exam findings may also be noted above in assessment and plan     System Details   Skin Inspection - Normal.   Constitutional Overall appearance - Normal.   Head/Face Symmetric, TMJ tenderness not present    Eyes EOMI, PERRL   Right ear:  Canal clear, TM intact, no CASEY   Left ear:  Canal clear, TM intact, no CASEY   Nose: Septum midline, inferior turbinates not enlarged, nasal valves without collapse    Oral cavity/Oropharynx: No lesions or masses on inspection or palpation, tonsils symmetric    Neck: Soft without LAD, thyroid not enlarged  Voice clear/ no stridor   Other:      Scopes and Procedures:             REVIEW OF SYSTEMS:   GENERAL HEALTH: feels well otherwise  GENERAL : denies fever, chills, sweats, weight loss, weight gain  SKIN: denies any unusual skin lesions or rashes  RESPIRATORY: denies shortness of breath with exertion  NEURO: denies headaches    Current Outpatient Medications   Medication Sig Dispense Refill    amoxicillin clavulanate 875-125 MG Oral Tab Take 1 tablet by mouth every 12 (twelve) hours for 7 days. 14 tablet 0    QUEtiapine  MG Oral Tablet 24 Hr       atomoxetine 10 MG Oral Cap Take 10 mg in the morning      MONTELUKAST 10 MG Oral Tab TAKE 1 TABLET(10 MG) BY MOUTH EVERY NIGHT 90 tablet 0    escitalopram 20 MG Oral Tab Take 1 tablet (20 mg total) by mouth daily. 30 tablet 2    verapamil  MG Oral Tab CR Take 1 tablet (180 mg total) by mouth daily. buPROPion 75 MG Oral Tab Take 1 tablet (75 mg total) by mouth 2 (two) times daily. Patient takes different 150 mg daily      Multiple Vitamins-Minerals (TAB-A-SHAYLA MAXIMUM) Oral Tab Take 1 tablet by mouth daily.       albuterol sulfate (2.5 MG/3ML) 0.083% Inhalation Nebu Soln Take 3 mL (2.5 mg total) by nebulization every 4 (four) hours as needed for Wheezing. 30 ampule 0    SUMAtriptan Succinate 100 MG Oral Tab 1 tablet (100 mg total). (Patient not taking: Reported on 8/14/2023)      azelastine 0.1 % Nasal Solution 2 sprays by Nasal route 2 (two) times daily. (Patient not taking: Reported on 8/14/2023) 30 mL 0    QUEtiapine ER 50 MG Oral Tablet 24 Hr       Fenofibrate 145 MG Oral Tab Take 1 tablet (145 mg total) by mouth daily. (Patient not taking: Reported on 11/3/2022) 90 tablet 0    atorvastatin 20 MG Oral Tab Take 1 tablet (20 mg total) by mouth daily. 90 tablet 0    Norethindrone-Eth Estradiol (ALYACEN 1/35) 1-35 MG-MCG Oral Tab Take 1 tablet by mouth daily. (Patient not taking: Reported on 11/3/2022) 3 Package 0    Tiotropium Bromide Monohydrate (SPIRIVA RESPIMAT) 1.25 MCG/ACT Inhalation Aero Soln Inhale 2 sprays into the lungs daily. (Patient not taking: Reported on 11/3/2022) 3 Inhaler 2    Budesonide-Formoterol Fumarate (SYMBICORT) 160-4.5 MCG/ACT Inhalation Aerosol Inhale 2 puffs into the lungs 2 (two) times daily. Drink water after use. (Patient not taking: Reported on 11/3/2022) 3 Inhaler 0    Fexofenadine HCl 180 MG Oral Tab Take 1 tablet (180 mg total) by mouth daily. (Patient not taking: Reported on 8/14/2023) 90 tablet 0    nystatin 202416 UNIT/ML Mouth/Throat Suspension Take 5 mL (500,000 Units total) by mouth 4 (four) times daily. Swish and spit x 2 weeks (Patient not taking: Reported on 11/3/2022) 300 mL 6    EPINEPHrine (EPIPEN 2-BRETT) 0.3 MG/0.3ML Injection Solution Auto-injector Inject IM in event of  allergic reaction 1 each 0    Vitamin C 500 MG Oral Tab Take 500 mg by mouth daily. (Patient not taking: Reported on 11/3/2022)      DiphenhydrAMINE HCl 25 MG Oral Tab Take 1 tablet (25 mg total) by mouth every 6 (six) hours as needed for Itching or Allergies.  (Patient not taking: Reported on 8/14/2023)      ferrous sulfate 325 (65 FE) MG Oral Tab EC Take 1 tablet (325 mg total) by mouth daily with breakfast. (Patient not taking: Reported on 8/14/2023)      ClonazePAM 0.5 MG Oral Tab Take 2 tablets (1 mg total) by mouth nightly as needed. (Patient not taking: Reported on 8/14/2023)      omega-3 fatty acids 1000 MG Oral Cap Take 1,000 mg by mouth daily. (Patient not taking: Reported on 11/3/2022)      QUEtiapine Fumarate (SEROQUEL) 200 MG Oral Tab Take 300 mg by mouth nightly. (Patient not taking: Reported on 11/3/2022)        Past Medical History:   Diagnosis Date    Anxiety     Arrhythmia     CERVICAL DYSPLASIA 7/2011    Depression     Heart murmur     Hemorrhoid     IBS (irritable bowel syndrome)     Other and unspecified hyperlipidemia     Parotid swelling     Recurrent sinusitis     Seasonal allergies       Social History:  Social History     Socioeconomic History    Marital status:     Number of children: 0   Occupational History    Occupation:    Tobacco Use    Smoking status: Never    Smokeless tobacco: Never   Substance and Sexual Activity    Alcohol use: Yes     Comment: socially    Drug use: No    Sexual activity: Not Currently     Partners: Female     Birth control/protection: OCP   Social History Narrative    . Works as a . Social alcohol use. Non-smoker. No structured exercise. No children.   Has life partner          Jolanta Bañuelos MD  8/15/2023  4:05 PM

## 2023-08-16 ENCOUNTER — TELEPHONE (OUTPATIENT)
Dept: OTOLARYNGOLOGY | Facility: CLINIC | Age: 43
End: 2023-08-16

## 2023-08-16 ENCOUNTER — TELEPHONE (OUTPATIENT)
Dept: ALLERGY | Facility: CLINIC | Age: 43
End: 2023-08-16

## 2023-08-16 ENCOUNTER — PATIENT MESSAGE (OUTPATIENT)
Dept: ALLERGY | Facility: CLINIC | Age: 43
End: 2023-08-16

## 2023-08-16 DIAGNOSIS — J45.20 MILD INTERMITTENT INTRINSIC ASTHMA WITHOUT STATUS ASTHMATICUS WITHOUT COMPLICATION: Primary | ICD-10-CM

## 2023-08-16 RX ORDER — BUDESONIDE AND FORMOTEROL FUMARATE DIHYDRATE 80; 4.5 UG/1; UG/1
2 AEROSOL RESPIRATORY (INHALATION) 2 TIMES DAILY
Qty: 3 EACH | Refills: 0 | Status: SHIPPED | OUTPATIENT
Start: 2023-08-16

## 2023-08-16 RX ORDER — BUDESONIDE AND FORMOTEROL FUMARATE DIHYDRATE 80; 4.5 UG/1; UG/1
2 AEROSOL RESPIRATORY (INHALATION) 2 TIMES DAILY
Qty: 1 EACH | Refills: 0 | Status: SHIPPED | OUTPATIENT
Start: 2023-08-16

## 2023-08-16 NOTE — TELEPHONE ENCOUNTER
Spoke to patient, informed her that we sent the medication to both pharmacies as requested. She verbalizes her understanding and no further action needed at this time.

## 2023-08-16 NOTE — TELEPHONE ENCOUNTER
Pt calling to talk to Rn about scheduling surgery and recovery time - see [facility] [employer] message - can reply there or call pt

## 2023-08-16 NOTE — TELEPHONE ENCOUNTER
From: Love Lopes  To: Hassan Seip, MD  Sent: 8/16/2023 9:41 AM CDT  Subject: Symbicort    Hello. During my visit, I had misinformed you when I said that I was using symbicort. The only inhaler that I have on hand is my rescue inhaler, Albuterol. If you feel that I should be on symbicort as well, could you please send a prescription for a 30 day supply to the [facility] in [place] for me and a 90 day supply to [address] [place] [address]. They are separate insurances and should cover both. Thank you!

## 2023-08-16 NOTE — TELEPHONE ENCOUNTER
Pt contacted, confirmed name, and . Pt verbalizes understanding, and denies further questions. She reports she can eat shrimp as long as it is cooked very thoroughly like in pasta. She does not tolerate shrimp cocktail. Pt doesn't think she will need to oral challenge at this time. She will await our call back regarding the IgE to oyster and pineapple when those results return.

## 2023-08-16 NOTE — TELEPHONE ENCOUNTER
Okay to send prescription for Symbicort 80/4.5  2 puffs twice a day to most pharmacies as requested by patient.

## 2023-08-16 NOTE — TELEPHONE ENCOUNTER
----- Message from Brandon Schultz MD sent at 8/15/2023 12:09 PM CDT -----   Please contact patient with recent serum IgE testing to select foods including shrimp 0.13.   May consider oral challenge if no reaction over the prior year    Testing was negative to pecan, crab lobster, scallop, clam

## 2023-08-17 ENCOUNTER — PATIENT MESSAGE (OUTPATIENT)
Dept: OTOLARYNGOLOGY | Facility: CLINIC | Age: 43
End: 2023-08-17

## 2023-08-18 LAB
F210-IGE PINEAPPLE: <0.1 KU/L
F290-IGE OYSTER: <0.1 KU/L

## 2023-08-21 NOTE — TELEPHONE ENCOUNTER
Pt calling back - asking for surgery to be scheduled for 9/20  - and to confirm through [facility]

## 2023-08-22 DIAGNOSIS — J34.2 DEVIATED NASAL SEPTUM: Primary | ICD-10-CM

## 2023-08-22 DIAGNOSIS — J34.3 HYPERTROPHY OF NASAL TURBINATES: ICD-10-CM

## 2023-08-22 DIAGNOSIS — M95.0 NASAL VALVE COLLAPSE: ICD-10-CM

## 2023-08-23 DIAGNOSIS — J32.9 CHRONIC SINUSITIS, UNSPECIFIED LOCATION: Primary | ICD-10-CM

## 2023-08-23 NOTE — TELEPHONE ENCOUNTER
Spoke with patient. Verified name and . Informed patient of test results per Dr. Ashli Shepard. Patient verbalizes understanding, no further questions at this time.       Dorys Curry MD            Please contact patient with negative serum IgE testing to pineapple and oyster

## 2023-08-28 ENCOUNTER — HOSPITAL ENCOUNTER (EMERGENCY)
Facility: HOSPITAL | Age: 43
Discharge: HOME | End: 2023-08-28
Attending: EMERGENCY MEDICINE
Payer: COMMERCIAL

## 2023-08-28 VITALS
SYSTOLIC BLOOD PRESSURE: 117 MMHG | RESPIRATION RATE: 16 BRPM | HEART RATE: 65 BPM | DIASTOLIC BLOOD PRESSURE: 75 MMHG | OXYGEN SATURATION: 99 % | BODY MASS INDEX: 31 KG/M2 | WEIGHT: 175 LBS | TEMPERATURE: 98 F

## 2023-08-28 DIAGNOSIS — R42 VERTIGO: Primary | ICD-10-CM

## 2023-08-28 LAB
ANION GAP SERPL CALC-SCNC: 5 MMOL/L (ref 0–18)
ATRIAL RATE: 70 BPM
BASOPHILS # BLD AUTO: 0.09 X10(3) UL (ref 0–0.2)
BASOPHILS NFR BLD AUTO: 1.2 %
BILIRUB UR QL: NEGATIVE
BUN BLD-MCNC: 16 MG/DL (ref 7–18)
BUN/CREAT SERPL: 20.8 (ref 10–20)
CALCIUM BLD-MCNC: 8.7 MG/DL (ref 8.5–10.1)
CHLORIDE SERPL-SCNC: 108 MMOL/L (ref 98–112)
CLARITY UR: CLEAR
CO2 SERPL-SCNC: 25 MMOL/L (ref 21–32)
CREAT BLD-MCNC: 0.77 MG/DL
DEPRECATED RDW RBC AUTO: 40.4 FL (ref 35.1–46.3)
EGFRCR SERPLBLD CKD-EPI 2021: 98 ML/MIN/1.73M2 (ref 60–?)
EOSINOPHIL # BLD AUTO: 0.38 X10(3) UL (ref 0–0.7)
EOSINOPHIL NFR BLD AUTO: 5.2 %
ERYTHROCYTE [DISTWIDTH] IN BLOOD BY AUTOMATED COUNT: 13.4 % (ref 11–15)
GLUCOSE BLD-MCNC: 113 MG/DL (ref 70–99)
GLUCOSE UR-MCNC: NORMAL MG/DL
HCT VFR BLD AUTO: 38.2 %
HGB BLD-MCNC: 12.3 G/DL
HGB UR QL STRIP.AUTO: NEGATIVE
IMM GRANULOCYTES # BLD AUTO: 0.02 X10(3) UL (ref 0–1)
IMM GRANULOCYTES NFR BLD: 0.3 %
KETONES UR-MCNC: NEGATIVE MG/DL
LEUKOCYTE ESTERASE UR QL STRIP.AUTO: NEGATIVE
LYMPHOCYTES # BLD AUTO: 3 X10(3) UL (ref 1–4)
LYMPHOCYTES NFR BLD AUTO: 40.9 %
MCH RBC QN AUTO: 26.6 PG (ref 26–34)
MCHC RBC AUTO-ENTMCNC: 32.2 G/DL (ref 31–37)
MCV RBC AUTO: 82.7 FL
MONOCYTES # BLD AUTO: 0.59 X10(3) UL (ref 0.1–1)
MONOCYTES NFR BLD AUTO: 8 %
NEUTROPHILS # BLD AUTO: 3.26 X10 (3) UL (ref 1.5–7.7)
NEUTROPHILS # BLD AUTO: 3.26 X10(3) UL (ref 1.5–7.7)
NEUTROPHILS NFR BLD AUTO: 44.4 %
NITRITE UR QL STRIP.AUTO: NEGATIVE
OSMOLALITY SERPL CALC.SUM OF ELEC: 288 MOSM/KG (ref 275–295)
P AXIS: 38 DEGREES
P-R INTERVAL: 142 MS
PH UR: 6.5 [PH] (ref 5–8)
PLATELET # BLD AUTO: 193 10(3)UL (ref 150–450)
POTASSIUM SERPL-SCNC: 4.5 MMOL/L (ref 3.5–5.1)
PROT UR-MCNC: NEGATIVE MG/DL
Q-T INTERVAL: 416 MS
QRS DURATION: 82 MS
QTC CALCULATION (BEZET): 449 MS
R AXIS: 38 DEGREES
RBC # BLD AUTO: 4.62 X10(6)UL
SODIUM SERPL-SCNC: 138 MMOL/L (ref 136–145)
SP GR UR STRIP: 1.01 (ref 1–1.03)
T AXIS: -1 DEGREES
UROBILINOGEN UR STRIP-ACNC: NORMAL
VENTRICULAR RATE: 70 BPM
WBC # BLD AUTO: 7.3 X10(3) UL (ref 4–11)

## 2023-08-28 PROCEDURE — 81003 URINALYSIS AUTO W/O SCOPE: CPT | Performed by: EMERGENCY MEDICINE

## 2023-08-28 PROCEDURE — 99284 EMERGENCY DEPT VISIT MOD MDM: CPT

## 2023-08-28 PROCEDURE — 93005 ELECTROCARDIOGRAM TRACING: CPT

## 2023-08-28 PROCEDURE — 96361 HYDRATE IV INFUSION ADD-ON: CPT

## 2023-08-28 PROCEDURE — 96360 HYDRATION IV INFUSION INIT: CPT

## 2023-08-28 PROCEDURE — 93010 ELECTROCARDIOGRAM REPORT: CPT

## 2023-08-28 PROCEDURE — 80048 BASIC METABOLIC PNL TOTAL CA: CPT | Performed by: EMERGENCY MEDICINE

## 2023-08-28 PROCEDURE — 85025 COMPLETE CBC W/AUTO DIFF WBC: CPT | Performed by: EMERGENCY MEDICINE

## 2023-08-28 RX ORDER — MECLIZINE HYDROCHLORIDE 25 MG/1
25 TABLET ORAL EVERY 6 HOURS PRN
Qty: 20 TABLET | Refills: 0 | Status: SHIPPED | OUTPATIENT
Start: 2023-08-28 | End: 2023-09-04

## 2023-08-28 RX ORDER — MECLIZINE HYDROCHLORIDE 25 MG/1
25 TABLET ORAL ONCE
Status: COMPLETED | OUTPATIENT
Start: 2023-08-28 | End: 2023-08-28

## 2023-08-28 NOTE — ED INITIAL ASSESSMENT (HPI)
Patient complains of dizziness, drowsiness for a couple of days, states she accidentally doubled up on her seroquel two days ago

## 2023-08-28 NOTE — DISCHARGE INSTRUCTIONS
Return if worsening dizziness meclizine every 6-8 hours as needed for dizziness drink plenty of fluids.   Follow-up with your doctor or ENT

## 2023-09-06 ENCOUNTER — PATIENT MESSAGE (OUTPATIENT)
Dept: ALLERGY | Facility: CLINIC | Age: 43
End: 2023-09-06

## 2023-09-07 RX ORDER — MONTELUKAST SODIUM 10 MG/1
10 TABLET ORAL EVERY EVENING
Qty: 90 TABLET | Refills: 1 | Status: SHIPPED | OUTPATIENT
Start: 2023-09-07

## 2023-09-07 NOTE — TELEPHONE ENCOUNTER
Spoke to patient. She verbalizes her understanding, and will contact [facility] to fill the 90 day supply locally. RN advised if the 90 day at [facility] is going to be expensive, they can split the Rx to 30. Patient verbalizes her understanding, and will reach out to our office if she needs anything else.

## 2023-09-07 NOTE — TELEPHONE ENCOUNTER
From: Rey Goldstein  To: Pancho Sylvester MD  Sent: 9/6/2023 11:34 PM CDT  Subject: Montelukast    Hello. When I was in the office with Dr Andrae Grijalva, he had sent a 30 day supply of montelukast to my local [facility] to get me started on the medication. He was also supposed to send a prescription to my [facility] meds by mail, so that I can get 90 day supply without a copay. I still have not received my 90 day supply by mail as of yet. Was there an issue sending in the prescription? Because it should've been covered. Please let me know what the issue is because my last dose is on Monday and the medication is a tremendous help with my allergies.   Thank you on advance,  Mann Ma

## 2023-09-07 NOTE — TELEPHONE ENCOUNTER
Rx for Singulair sent to mail order pharmacy as requested. I do not see record that 90 days was sent to mail order pharmacy at office visit with Dr. Dk Arthur, but it was sent to the local [facility]'s for 90 days. Did patient only  the thirty days? Called [facility] mail order pharmacy. Spoke to [facility]. Quickest delivery is 14-21 days. Unfortunately to have the medication by Monday she will need to get it from the local pharmacy. Pharmacy reports they have the 90 day Rx on file.

## 2023-09-08 RX ORDER — PREDNISONE 20 MG/1
20 TABLET ORAL DAILY
Qty: 10 TABLET | Refills: 0 | Status: SHIPPED | OUTPATIENT
Start: 2023-09-08 | End: 2023-09-18

## 2023-09-08 NOTE — TELEPHONE ENCOUNTER
Dr Reji Bean- pt is asking for script for prednisone- states you advised she should take a week prior to her surgery on 09/20?

## 2023-09-10 ENCOUNTER — PATIENT MESSAGE (OUTPATIENT)
Dept: OTOLARYNGOLOGY | Facility: CLINIC | Age: 43
End: 2023-09-10

## 2023-09-11 RX ORDER — AZELASTINE 1 MG/ML
2 SPRAY, METERED NASAL 2 TIMES DAILY
Qty: 90 ML | Refills: 1 | Status: SHIPPED | OUTPATIENT
Start: 2023-09-11

## 2023-09-11 NOTE — TELEPHONE ENCOUNTER
From: Tang Leon  To: Ezio Guaman MD  Sent: 9/10/2023 10:52 PM CDT  Subject: Azelastine    Hello. During my last visit with Dr. Tomas Teixeira, he had prescribed Azelastine spray for me and it has been helping with my allergies. I was hoping that he could refill my prescription, but send the prescription for a 90 day supply to my meds by mail through [facility], that way I won't get charged a copay.   Thank you in advance,   Jammie Kam

## 2023-09-16 RX ORDER — CHOLECALCIFEROL (VITAMIN D3) 125 MCG
2000 CAPSULE ORAL DAILY
COMMUNITY

## 2023-09-16 RX ORDER — METOCLOPRAMIDE 10 MG/1
10 TABLET ORAL ONCE
Status: CANCELLED | OUTPATIENT
Start: 2023-09-16 | End: 2023-09-16

## 2023-09-16 RX ORDER — BUPROPION HYDROCHLORIDE 150 MG/1
150 TABLET ORAL DAILY
COMMUNITY

## 2023-09-20 ENCOUNTER — ANESTHESIA (OUTPATIENT)
Dept: SURGERY | Facility: HOSPITAL | Age: 43
End: 2023-09-20
Payer: COMMERCIAL

## 2023-09-20 ENCOUNTER — HOSPITAL ENCOUNTER (OUTPATIENT)
Facility: HOSPITAL | Age: 43
Setting detail: HOSPITAL OUTPATIENT SURGERY
Discharge: HOME | End: 2023-09-20
Attending: STUDENT IN AN ORGANIZED HEALTH CARE EDUCATION/TRAINING PROGRAM | Admitting: STUDENT IN AN ORGANIZED HEALTH CARE EDUCATION/TRAINING PROGRAM
Payer: COMMERCIAL

## 2023-09-20 ENCOUNTER — ANESTHESIA EVENT (OUTPATIENT)
Dept: SURGERY | Facility: HOSPITAL | Age: 43
End: 2023-09-20
Payer: COMMERCIAL

## 2023-09-20 VITALS
HEIGHT: 63 IN | DIASTOLIC BLOOD PRESSURE: 76 MMHG | SYSTOLIC BLOOD PRESSURE: 134 MMHG | RESPIRATION RATE: 16 BRPM | HEART RATE: 74 BPM | OXYGEN SATURATION: 96 % | BODY MASS INDEX: 31.01 KG/M2 | TEMPERATURE: 97 F | WEIGHT: 175 LBS

## 2023-09-20 DIAGNOSIS — J32.9 CHRONIC SINUSITIS, UNSPECIFIED LOCATION: ICD-10-CM

## 2023-09-20 LAB — B-HCG UR QL: NEGATIVE

## 2023-09-20 PROCEDURE — 8E09XBZ COMPUTER ASSISTED PROCEDURE OF HEAD AND NECK REGION: ICD-10-PCS | Performed by: STUDENT IN AN ORGANIZED HEALTH CARE EDUCATION/TRAINING PROGRAM

## 2023-09-20 PROCEDURE — 099V4ZZ DRAINAGE OF LEFT ETHMOID SINUS, PERCUTANEOUS ENDOSCOPIC APPROACH: ICD-10-PCS | Performed by: STUDENT IN AN ORGANIZED HEALTH CARE EDUCATION/TRAINING PROGRAM

## 2023-09-20 PROCEDURE — 09BL4ZZ EXCISION OF NASAL TURBINATE, PERCUTANEOUS ENDOSCOPIC APPROACH: ICD-10-PCS | Performed by: STUDENT IN AN ORGANIZED HEALTH CARE EDUCATION/TRAINING PROGRAM

## 2023-09-20 PROCEDURE — 099Q4ZZ DRAINAGE OF RIGHT MAXILLARY SINUS, PERCUTANEOUS ENDOSCOPIC APPROACH: ICD-10-PCS | Performed by: STUDENT IN AN ORGANIZED HEALTH CARE EDUCATION/TRAINING PROGRAM

## 2023-09-20 PROCEDURE — 81025 URINE PREGNANCY TEST: CPT

## 2023-09-20 PROCEDURE — 099U4ZZ DRAINAGE OF RIGHT ETHMOID SINUS, PERCUTANEOUS ENDOSCOPIC APPROACH: ICD-10-PCS | Performed by: STUDENT IN AN ORGANIZED HEALTH CARE EDUCATION/TRAINING PROGRAM

## 2023-09-20 PROCEDURE — 099R4ZZ DRAINAGE OF LEFT MAXILLARY SINUS, PERCUTANEOUS ENDOSCOPIC APPROACH: ICD-10-PCS | Performed by: STUDENT IN AN ORGANIZED HEALTH CARE EDUCATION/TRAINING PROGRAM

## 2023-09-20 PROCEDURE — 88305 TISSUE EXAM BY PATHOLOGIST: CPT | Performed by: STUDENT IN AN ORGANIZED HEALTH CARE EDUCATION/TRAINING PROGRAM

## 2023-09-20 DEVICE — PROPEL CONTOUR SINUS IMPLANT
Type: IMPLANTABLE DEVICE | Site: NOSE | Status: FUNCTIONAL
Brand: PROPEL CONTOUR

## 2023-09-20 RX ORDER — MIDAZOLAM HYDROCHLORIDE 1 MG/ML
INJECTION INTRAMUSCULAR; INTRAVENOUS AS NEEDED
Status: DISCONTINUED | OUTPATIENT
Start: 2023-09-20 | End: 2023-09-20 | Stop reason: SURG

## 2023-09-20 RX ORDER — LIDOCAINE HYDROCHLORIDE 10 MG/ML
INJECTION, SOLUTION EPIDURAL; INFILTRATION; INTRACAUDAL; PERINEURAL AS NEEDED
Status: DISCONTINUED | OUTPATIENT
Start: 2023-09-20 | End: 2023-09-20 | Stop reason: SURG

## 2023-09-20 RX ORDER — HYDROCODONE BITARTRATE AND ACETAMINOPHEN 5; 325 MG/1; MG/1
1 TABLET ORAL ONCE AS NEEDED
Status: COMPLETED | OUTPATIENT
Start: 2023-09-20 | End: 2023-09-20

## 2023-09-20 RX ORDER — GLYCOPYRROLATE 0.2 MG/ML
INJECTION, SOLUTION INTRAMUSCULAR; INTRAVENOUS AS NEEDED
Status: DISCONTINUED | OUTPATIENT
Start: 2023-09-20 | End: 2023-09-20 | Stop reason: SURG

## 2023-09-20 RX ORDER — ONDANSETRON 2 MG/ML
INJECTION INTRAMUSCULAR; INTRAVENOUS AS NEEDED
Status: DISCONTINUED | OUTPATIENT
Start: 2023-09-20 | End: 2023-09-20 | Stop reason: SURG

## 2023-09-20 RX ORDER — SODIUM CHLORIDE, SODIUM LACTATE, POTASSIUM CHLORIDE, CALCIUM CHLORIDE 600; 310; 30; 20 MG/100ML; MG/100ML; MG/100ML; MG/100ML
INJECTION, SOLUTION INTRAVENOUS CONTINUOUS
Status: DISCONTINUED | OUTPATIENT
Start: 2023-09-20 | End: 2023-09-20

## 2023-09-20 RX ORDER — ACETAMINOPHEN 500 MG
1000 TABLET ORAL ONCE
Status: COMPLETED | OUTPATIENT
Start: 2023-09-20 | End: 2023-09-20

## 2023-09-20 RX ORDER — ROCURONIUM BROMIDE 10 MG/ML
INJECTION, SOLUTION INTRAVENOUS AS NEEDED
Status: DISCONTINUED | OUTPATIENT
Start: 2023-09-20 | End: 2023-09-20 | Stop reason: SURG

## 2023-09-20 RX ORDER — MORPHINE SULFATE 4 MG/ML
2 INJECTION, SOLUTION INTRAMUSCULAR; INTRAVENOUS EVERY 10 MIN PRN
Status: DISCONTINUED | OUTPATIENT
Start: 2023-09-20 | End: 2023-09-20

## 2023-09-20 RX ORDER — MORPHINE SULFATE 10 MG/ML
6 INJECTION, SOLUTION INTRAMUSCULAR; INTRAVENOUS EVERY 10 MIN PRN
Status: DISCONTINUED | OUTPATIENT
Start: 2023-09-20 | End: 2023-09-20

## 2023-09-20 RX ORDER — NALOXONE HYDROCHLORIDE 0.4 MG/ML
0.08 INJECTION, SOLUTION INTRAMUSCULAR; INTRAVENOUS; SUBCUTANEOUS AS NEEDED
Status: DISCONTINUED | OUTPATIENT
Start: 2023-09-20 | End: 2023-09-20

## 2023-09-20 RX ORDER — LIDOCAINE HYDROCHLORIDE AND EPINEPHRINE 10; 10 MG/ML; UG/ML
INJECTION, SOLUTION INFILTRATION; PERINEURAL AS NEEDED
Status: DISCONTINUED | OUTPATIENT
Start: 2023-09-20 | End: 2023-09-20 | Stop reason: HOSPADM

## 2023-09-20 RX ORDER — HYDROMORPHONE HYDROCHLORIDE 1 MG/ML
0.4 INJECTION, SOLUTION INTRAMUSCULAR; INTRAVENOUS; SUBCUTANEOUS EVERY 5 MIN PRN
Status: DISCONTINUED | OUTPATIENT
Start: 2023-09-20 | End: 2023-09-20

## 2023-09-20 RX ORDER — HYDROMORPHONE HYDROCHLORIDE 1 MG/ML
0.2 INJECTION, SOLUTION INTRAMUSCULAR; INTRAVENOUS; SUBCUTANEOUS EVERY 5 MIN PRN
Status: DISCONTINUED | OUTPATIENT
Start: 2023-09-20 | End: 2023-09-20

## 2023-09-20 RX ORDER — DEXAMETHASONE SODIUM PHOSPHATE 4 MG/ML
VIAL (ML) INJECTION AS NEEDED
Status: DISCONTINUED | OUTPATIENT
Start: 2023-09-20 | End: 2023-09-20 | Stop reason: SURG

## 2023-09-20 RX ORDER — FAMOTIDINE 20 MG/1
20 TABLET, FILM COATED ORAL ONCE
Status: COMPLETED | OUTPATIENT
Start: 2023-09-20 | End: 2023-09-20

## 2023-09-20 RX ORDER — EPHEDRINE SULFATE 50 MG/ML
INJECTION, SOLUTION INTRAVENOUS AS NEEDED
Status: DISCONTINUED | OUTPATIENT
Start: 2023-09-20 | End: 2023-09-20 | Stop reason: SURG

## 2023-09-20 RX ORDER — HYDROMORPHONE HYDROCHLORIDE 1 MG/ML
0.6 INJECTION, SOLUTION INTRAMUSCULAR; INTRAVENOUS; SUBCUTANEOUS EVERY 5 MIN PRN
Status: DISCONTINUED | OUTPATIENT
Start: 2023-09-20 | End: 2023-09-20

## 2023-09-20 RX ORDER — HYDROCODONE BITARTRATE AND ACETAMINOPHEN 5; 325 MG/1; MG/1
1 TABLET ORAL EVERY 6 HOURS PRN
Qty: 12 TABLET | Refills: 0 | Status: SHIPPED | OUTPATIENT
Start: 2023-09-20 | End: 2023-09-23

## 2023-09-20 RX ORDER — MORPHINE SULFATE 4 MG/ML
4 INJECTION, SOLUTION INTRAMUSCULAR; INTRAVENOUS EVERY 10 MIN PRN
Status: DISCONTINUED | OUTPATIENT
Start: 2023-09-20 | End: 2023-09-20

## 2023-09-20 RX ADMIN — ROCURONIUM BROMIDE 50 MG: 10 INJECTION, SOLUTION INTRAVENOUS at 12:55:00

## 2023-09-20 RX ADMIN — MIDAZOLAM HYDROCHLORIDE 2 MG: 1 INJECTION INTRAMUSCULAR; INTRAVENOUS at 12:53:00

## 2023-09-20 RX ADMIN — GLYCOPYRROLATE 0.2 MG: 0.2 INJECTION, SOLUTION INTRAMUSCULAR; INTRAVENOUS at 13:08:00

## 2023-09-20 RX ADMIN — SODIUM CHLORIDE, SODIUM LACTATE, POTASSIUM CHLORIDE, CALCIUM CHLORIDE: 600; 310; 30; 20 INJECTION, SOLUTION INTRAVENOUS at 12:54:00

## 2023-09-20 RX ADMIN — ROCURONIUM BROMIDE 20 MG: 10 INJECTION, SOLUTION INTRAVENOUS at 13:48:00

## 2023-09-20 RX ADMIN — EPHEDRINE SULFATE 10 MG: 50 INJECTION, SOLUTION INTRAVENOUS at 13:04:00

## 2023-09-20 RX ADMIN — DEXAMETHASONE SODIUM PHOSPHATE 4 MG: 4 MG/ML VIAL (ML) INJECTION at 13:02:00

## 2023-09-20 RX ADMIN — LIDOCAINE HYDROCHLORIDE 50 MG: 10 INJECTION, SOLUTION EPIDURAL; INFILTRATION; INTRACAUDAL; PERINEURAL at 12:55:00

## 2023-09-20 RX ADMIN — SODIUM CHLORIDE, SODIUM LACTATE, POTASSIUM CHLORIDE, CALCIUM CHLORIDE: 600; 310; 30; 20 INJECTION, SOLUTION INTRAVENOUS at 14:58:00

## 2023-09-20 RX ADMIN — ONDANSETRON 4 MG: 2 INJECTION INTRAMUSCULAR; INTRAVENOUS at 13:02:00

## 2023-09-20 NOTE — OPERATIVE REPORT
DATE OF SURGERY: 9/20/23  PREOPERATIVE DIAGNOSIS:   1. Chronic rhinosinusitis   2. Inferior turbinate hypertrophy  3. Septal deviation  4. Bilateral farzad bullosa  POSTOPERATIVE DIAGNOSIS: Same. OPERATIVE PROCEDURE:    1.  Endoscopic sinus surgery with image guidance  2. Bilateral maxillary antrostomy with removal of tissue  3. Bilateral total ethmoidectomy with removal of tissue  4. Bilateral farzad bullosa takedown  5. Bilateral frontal sinus balloon dilation  6. Bilateral sphenoid sinus balloon dilation  7. Septoplasty  8. Bilateral inferior turbinate reduction with the microdebrider  SURGEON: Giuseppe Whitley M.D.  (Otolaryngology)  Assistant: n/a    ANESTHESIA:  GENERAL. PROCEDURE: Patient was taken to the operating room and placed supine on the operating table. They underwent an uneventful intubation. The eyes were taped and was positioned in usual fashion for endoscopic sinus surgery. Image guidance was then arranged and registration performed with adequate accuracy with a less than 2mm variance achieved. The patient was then draped in usual fashion. A timeout was performed and all parties were in agreement. I began placing Afrin-soaked pledgets into each nasal cavity. I then remove these after several minutes to allow adequate decongestion. Then turned my attention to the endoscopic sinus surgery portion of the case. I outfractured the inferior turbinates with a [place]. I injected approximately 6 cc of 1% lidocaine with 1-100,000 epinephrine into the axilla and head and lateral wall of the right middle turbinate followed by the left side. I turned my attention to the resection of the right farzad bullosa. I incised the middle turbinate with a sickle knife. I excised the lateral aspect of the middle turbinate with through-cutting instruments and the microdebrider until the air cell was opened and the lateral wall removed.   I performed the mirror procedure on the other side  I then turned my attention to the endoscopic maxillary antrostomies. Starting on the left , I then infractured the middle turbinate to gain access to the middle meatus. I then identified the uncinate process and medialized the free edge with a ball-tipped probe. I then remove the uncinate process with a backbiter and microdebrider. I then identified the maxillary os and entered this with a curved image guided suction. I then opened the maxillary os with side biting and backbiting instruments including the microdebrider until an accurate maxillary opening was created. I then removed any diseased mucosa or polypoid material within the maxillary sinus with instrumentation. Then irrigated the sinus clear. I then took down the ethmoid bullae with a J curette and powered instrumentation. I then entered into the posterior ethmoid air cells with a curette at the inferior medial aspect of the basal lamella. I then took down the posterior ethmoid air cells with thru cutting and powered instrumentation until the face of the sphenoid was encountered and then I worked back anteriorly. At the conclusion I then inserted a propel contour steroid eluding stent into each maxillary sinus. I then inserted hemopore into the anterior ethmoid cavity on each side. I then performed a mirror procedure on the contralateral side. I turned my attention to the sphenoid sinus balloon dilation. Starting on the right side of the nose I used a [place] to outfracture the middle and superior turbinates to gain access to the sphenoid os. I then inserted the sphenoid sinus balloon into the sphenoid os with image navigation and visualization. Once confirmed within the sinus I then inflated the balloon for 7 seconds. The wound was let down and removed from the nose. The mirror procedure was performed on the left side of the nose. I turned my attention to the frontal sinus balloon dilation.  Staring on the left side of the nose I used an image navigated frontal sinus balloon to enter through the frontal sinus os. Once confirmed within the sinus I then inflated the balloon for 5 seconds. The balloon was let down and removed from the nose. The mirror procedure was performed on the right side of the nose. Pledgets soaked in Mark-Synephrine were inserted in each nasal cavity. Approximately 6 cc of 1% lidocaine with 1 100,000 epinephrine was injected into the area of the nasal septum. After adequate anesthesia and vasoconstriction the pledgets were removed. I then began with a hemitransfixion incision on the left with a 15 blade. This exposed the underlying caudal septal cartilage. I then found the subperichondrial plane and elevated a subperichondrial flap to the bony cartilaginous junction on the left side. I then scored the cartilage in vertical fashion with care to leave an adequate anterior strut of the cartilage. I then crossed over to onto the other side of the cartilage. I then raised a mucoperichondrial flap on the right side back to the bony cartilaginous junction. I then used a straight scissors to incise the cartilage from anterior to posterior with care to leave an adequate dorsal strut. I then disarticulated the cartilage from the maxillary crest.  I then removed the offending cartilage. I removed any remaining cartilage or deviation along the nasal floor. After adequate removal of offending cartilage I then inspected each nasal cavity again with a 0 degree endoscope. This revealed improvement in their nasal airways with straightening of the septum and adequate view of the middle turbinate on each side. I then closed the hemitransfixion incision with simple interrupted sutures with 4-0 chromic suture. I then placed a quilting suture. Following this I performed inferior turbinate reduction with the microdebrider.   I injected approximately 1 cc of 1% lidocaine with 1 to 100,000 epinephrine into the head of each inferior turbinate. I then made a stab incision with the debrider at the head of the turbinate and worked from anterior to posterior to perform submucous tissue resection with the microdebrider. Following this I then outfractured the inferior turbinates. I again suctioned out the nasopharynx and assured hemostasis. I then suctioned out the stomach with an OG tube. The patient was then handed over to anesthesia who successfully extubated the patient and returned them to PACU in stable fashion. There was no persistent hemostasis. Their vision was evaluated in PACU and seen to be intact. Estimated blood loss: 50 cc  Implants or drains: Propel steroid eluding stents into bilateral maxillary cavity.   Hemopore each ethmoid cavity  Specimen: Sinus contents

## 2023-09-20 NOTE — ANESTHESIA PROCEDURE NOTES
Airway  Date/Time: 9/20/2023 1:00 PM  Urgency: Elective      General Information and Staff    Patient location during procedure: OR  Anesthesiologist: Rosalba Dietrich MD  Resident/CRNA: Estrellita Ovalle CRNA  Performed: CRNA   Performed by: Estrellita Ovalle CRNA  Authorized by: Rosalba Dietrich MD      Indications and Patient Condition  Indications for airway management: anesthesia  Spontaneous ventilation: present  Sedation level: deep  Preoxygenated: yes  Patient position: sniffing  Mask difficulty assessment: 1 - vent by mask    Final Airway Details  Final airway type: endotracheal airway      Successful airway: ETT  Cuffed: yes   Successful intubation technique: Video laryngoscopy  Endotracheal tube insertion site: oral  Blade: Thierno  Blade size: #3  ETT size (mm): 7.0    Cormack-Lehane Classification: grade I - full view of glottis  Placement verified by: capnometry   Cuff volume (mL): 10  Measured from: lips  ETT to lips (cm): 20  Number of attempts at approach: 1  Number of other approaches attempted: 0

## 2023-09-20 NOTE — BRIEF OP NOTE
Pre-Operative Diagnosis: Chronic sinusitis, unspecified location [J32.9]     Post-Operative Diagnosis: Chronic sinusitis, unspecified location [J32.9]      Procedure Performed:   nasal septoplasty bilateral sinus endoscopy maxillary antrostomy with tissue removal endoscopy total ethmoidectomy endoscopy frontal bilateral sinuplasty sphenoid/frontal    Surgeon(s) and Role:     Clarita Trejo MD - Primary    Assistant(s):        Surgical Findings: bilateral farzad bullosa  Slight septal deviation to the right in the area of the MT  ITH bilateral     Specimen: sinus contents     Estimated Blood Loss: No data recorded    Dictation Number:      Krystina Carrillo MD  9/20/2023  3:07 PM

## 2023-09-21 ENCOUNTER — TELEPHONE (OUTPATIENT)
Dept: OTOLARYNGOLOGY | Facility: CLINIC | Age: 43
End: 2023-09-21

## 2023-09-21 NOTE — TELEPHONE ENCOUNTER
Pt is post op day 1 endo maxillary with tissue removal, endo total ethmoidectomy, endo sphenoid, endo frontal, septoplasty, SMR. Per  Pt pt is doing well little bleeding,from one nare, left nare no bleeding, eating and drinking well, pain is controlled by prescribed pain medication,advised pt no bending or heavy lifting for the next week and pt is not to blow nose until seen by  Advised pt she can start using OTC saline nasal spray daily as patient states was instructed by MD,, afrin up to 5 days post op for congestion. Reviewed post op medications. Advised pt to contact our office if any increased bleeding  (saturating more than 4 mustache dressings within the hour) or fever greater than 102. Pt verbalized understanding. Follow up appointment made.     Future Appointments   Date Time Provider Smith Griffith   9/26/2023  9:20 AM Sidra Curry MD [address] [facility]

## 2023-09-26 ENCOUNTER — OFFICE VISIT (OUTPATIENT)
Dept: OTOLARYNGOLOGY | Facility: CLINIC | Age: 43
End: 2023-09-26

## 2023-09-26 DIAGNOSIS — J34.2 NASAL SEPTAL DEVIATION: ICD-10-CM

## 2023-09-26 DIAGNOSIS — J32.9 CHRONIC SINUSITIS, UNSPECIFIED LOCATION: Primary | ICD-10-CM

## 2023-09-26 PROCEDURE — 31237 NSL/SINS NDSC SURG BX POLYPC: CPT | Performed by: STUDENT IN AN ORGANIZED HEALTH CARE EDUCATION/TRAINING PROGRAM

## 2023-09-26 PROCEDURE — 99024 POSTOP FOLLOW-UP VISIT: CPT | Performed by: STUDENT IN AN ORGANIZED HEALTH CARE EDUCATION/TRAINING PROGRAM

## 2023-09-26 NOTE — PROGRESS NOTES
Michael Yi is a 37year old female. Patient presents with:  Post-Op:  endo maxillary with tissue removal, endo total ethmoidectomy, endo sphenoid, endo frontal, septoplasty, SMR.        ASSESSMENT AND PLAN:   1. Chronic sinusitis, unspecified location  24-year-old presents for first postoperative follow-up after endoscopic sinus surgery. She had throat pain with ulceration on her uvula possibly from a virus which is now finally feeling better. She has numbness of her lower lip that is slowly improving although still somewhat numb on the right side. Feels there is a crusting in her left nasal cavity. Still with mucosal congestion. Bilateral sinonasal cavities debrided. She is likely feeling the septal incision on the left. I want her to do Vaseline to the left septal incision to help with the crusting. She continue the saline irrigations. We will keep her on light duty at work for the next week. Would like to see her back in 2 weeks. The numbness of the lower lip is likely from the endotracheal tube this should resolve with time. 2. Nasal septal deviation        The patient indicates understanding of these issues and agrees to the plan.       EXAM:   LMP 08/20/2023 (Approximate)     Pertinent exam findings may also be noted above in assessment and plan     System Details   Skin Inspection - Normal.   Constitutional Overall appearance - Normal.   Head/Face Symmetric, TMJ tenderness not present    Eyes EOMI, PERRL   Right ear:  Canal clear, TM intact, no CASEY   Left ear:  Canal clear, TM intact, no CASEY   Nose: Septum midline, inferior turbinates not enlarged, nasal valves without collapse    Oral cavity/Oropharynx: No lesions or masses on inspection or palpation, tonsils symmetric    Neck: Soft without LAD, thyroid not enlarged  Voice clear/ no stridor   Other:      Scopes and Procedures:     Bilateral nasal Endoscopy and sinus debridement procedure Note     Need for debridement of crusting and mucus from bilateral nasal and sinus cavities status post endoscopic sinus surgery. As well as inspection of the healing of the sinus cavities. Due to inability for adequate examination of the nose and nasopharynx and need for magnification to perform the examination, endoscopy was performed. Risks and benefits were discussed with patient/family and they have given verbal consent to proceed. Pre-operative Diagnosis:   Chronic sinusitis, unspecified location  (primary encounter diagnosis)    Post-operative Diagnosis: Same    Procedure: Diagnostic nasal endoscopy and sinonasal debridement    Anesthesia: Topical anesthetic [place]     Surgeon Ya Zuñiga MD    EBL: 0cc    Procedure Detail & Findings:     After placement of topical anesthetic intranasally the endoscope was inserted into each nares and driven through the nasal cavity into the nasopharynx. The following findings were noted:    Septum: Midline   Inferior turbinates: Normal  Middle meatus: Patent no purulence. Middle turbinates: Normal  Purulence: None noted  Polyps: None noted  Nasopharynx and eustachian tube: No masses  Other: Crusting and debris debrided from bilateral sinonasal cavities with instrumentation and suction    Condition: Stable    Complications: Patient tolerated the procedure well with no immediate complication. Troy Harrison MD        Current Outpatient Medications   Medication Sig Dispense Refill    buPROPion  MG Oral Tablet 24 Hr Take 1 tablet (150 mg total) by mouth daily. cholecalciferol 50 MCG (2000 UT) Oral Tab Take 1 tablet (2,000 Units total) by mouth daily. azelastine 0.1 % Nasal Solution 2 sprays by Nasal route 2 (two) times daily. 90 mL 1    montelukast 10 MG Oral Tab Take 1 tablet (10 mg total) by mouth every evening. 90 tablet 1    Budesonide-Formoterol Fumarate 80-4.5 MCG/ACT Inhalation Aerosol Inhale 2 puffs into the lungs 2 (two) times daily.  1 each 0    Budesonide-Formoterol Fumarate 80-4.5 MCG/ACT Inhalation Aerosol Inhale 2 puffs into the lungs 2 (two) times daily. 3 each 0    QUEtiapine  MG Oral Tablet 24 Hr Take 1 tablet (400 mg total) by mouth nightly. atomoxetine 10 MG Oral Cap Take 10 mg in the morning      MONTELUKAST 10 MG Oral Tab TAKE 1 TABLET(10 MG) BY MOUTH EVERY NIGHT 90 tablet 0    escitalopram 20 MG Oral Tab Take 1 tablet (20 mg total) by mouth daily. 30 tablet 2    SUMAtriptan Succinate 100 MG Oral Tab 1 tablet (100 mg total). verapamil  MG Oral Tab CR Take 1 tablet (180 mg total) by mouth nightly. buPROPion 75 MG Oral Tab Take 1 tablet (75 mg total) by mouth 2 (two) times daily. Patient takes different 150 mg daily      Fexofenadine HCl 180 MG Oral Tab Take 1 tablet (180 mg total) by mouth daily. (Patient taking differently: Take 1 tablet (180 mg total) by mouth nightly.) 90 tablet 0    Multiple Vitamins-Minerals (TAB-A-SHAYLA MAXIMUM) Oral Tab Take 1 tablet by mouth daily. albuterol sulfate (2.5 MG/3ML) 0.083% Inhalation Nebu Soln Take 3 mL (2.5 mg total) by nebulization every 4 (four) hours as needed for Wheezing. 30 ampule 0    QUEtiapine ER 50 MG Oral Tablet 24 Hr       Tiotropium Bromide Monohydrate (SPIRIVA RESPIMAT) 1.25 MCG/ACT Inhalation Aero Soln Inhale 2 sprays into the lungs daily. (Patient not taking: Reported on 11/3/2022) 3 Inhaler 2    Vitamin C 500 MG Oral Tab Take 500 mg by mouth daily. (Patient not taking: Reported on 11/3/2022)      DiphenhydrAMINE HCl 25 MG Oral Tab Take 1 tablet (25 mg total) by mouth every 6 (six) hours as needed for Itching or Allergies. (Patient not taking: Reported on 8/14/2023)      ferrous sulfate 325 (65 FE) MG Oral Tab EC Take 1 tablet (325 mg total) by mouth daily with breakfast. (Patient not taking: Reported on 8/14/2023)      ClonazePAM 0.5 MG Oral Tab Take 2 tablets (1 mg total) by mouth nightly as needed.  (Patient not taking: Reported on 8/14/2023)      omega-3 fatty acids 1000 MG Oral Cap Take 1,000 mg by mouth daily. (Patient not taking: Reported on 11/3/2022)      QUEtiapine Fumarate (SEROQUEL) 200 MG Oral Tab Take 300 mg by mouth nightly. (Patient not taking: Reported on 11/3/2022)        Past Medical History:   Diagnosis Date    Anxiety     Arrhythmia     Attention deficit hyperactivity disorder (ADHD)     CERVICAL DYSPLASIA 07/2011    Depression     Hearing impairment     auditory neuropathy    Heart murmur     Hemorrhoid     Hx of motion sickness     IBS (irritable bowel syndrome)     Migraines     Other and unspecified hyperlipidemia     Parotid swelling     Recurrent sinusitis     Seasonal allergies     Visual impairment       Social History:  Social History     Socioeconomic History    Marital status:     Number of children: 0   Occupational History    Occupation:    Tobacco Use    Smoking status: Never    Smokeless tobacco: Never   Vaping Use    Vaping Use: Never used   Substance and Sexual Activity    Alcohol use: Yes     Comment: socially    Drug use: No    Sexual activity: Not Currently     Partners: Female     Birth control/protection: OCP   Social History Narrative    . Works as a . Social alcohol use. Non-smoker. No structured exercise. No children.   Has life partner          Ramon Sheets MD  9/26/2023  9:51 AM

## 2023-10-10 ENCOUNTER — OFFICE VISIT (OUTPATIENT)
Dept: OTOLARYNGOLOGY | Facility: CLINIC | Age: 43
End: 2023-10-10

## 2023-10-10 DIAGNOSIS — J32.9 CHRONIC SINUSITIS, UNSPECIFIED LOCATION: Primary | ICD-10-CM

## 2023-10-10 PROCEDURE — 99024 POSTOP FOLLOW-UP VISIT: CPT | Performed by: STUDENT IN AN ORGANIZED HEALTH CARE EDUCATION/TRAINING PROGRAM

## 2023-10-10 PROCEDURE — 31237 NSL/SINS NDSC SURG BX POLYPC: CPT | Performed by: STUDENT IN AN ORGANIZED HEALTH CARE EDUCATION/TRAINING PROGRAM

## 2023-10-10 NOTE — PROGRESS NOTES
Jaqueline Hughes is a 37year old female. Patient presents with:  Post-Op: endo maxillary with tissue removal, endo total ethmoidectomy, endo sphenoid, endo frontal, septoplasty, SMR. Second visit, bump on left nostril. ASSESSMENT AND PLAN:   1. Chronic sinusitis, unspecified location  28-year-old presents in follow-up for second postoperative follow-up after endoscopic sinus surgery doing well feeling much better at this appointment. Still feels a little sensitivity on the inside of her left nostril. The area that she feels a sensitive is the incision line of her left anterior nasal septum that is healing well although still needs a little longer to heal completely. Both sinonasal cavities were debrided with good healing so far. Good results so far. She can do the irrigations a few times a week now. Can resume normal activities if she has not. She can see me back for any issues or concerns. The patient indicates understanding of these issues and agrees to the plan. EXAM:   [facility] 08/20/2023 (Approximate)     Pertinent exam findings may also be noted above in assessment and plan     System Details   Skin Inspection - Normal.   Constitutional Overall appearance - Normal.   Head/Face Symmetric, TMJ tenderness not present    Eyes EOMI, PERRL   Right ear:  Canal clear, TM intact, no CASEY   Left ear:  Canal clear, TM intact, no CASEY   Nose: Septum midline, inferior turbinates not enlarged, nasal valves without collapse    Oral cavity/Oropharynx: No lesions or masses on inspection or palpation, tonsils symmetric    Neck: Soft without LAD, thyroid not enlarged  Voice clear/ no stridor   Other:      Scopes and Procedures:     Bilateral Nasal Endoscopy and sinus debridement procedure Note     Need for debridement of crusting and mucus from bilateral nasal and sinus cavities status post endoscopic sinus surgery. As well as inspection of the healing of the sinus cavities.     Due to inability for adequate examination of the nose and nasopharynx and need for magnification to perform the examination, endoscopy was performed. Risks and benefits were discussed with patient/family and they have given verbal consent to proceed. Pre-operative Diagnosis:   Chronic sinusitis, unspecified location  (primary encounter diagnosis)    Post-operative Diagnosis: Same    Procedure: Diagnostic nasal endoscopy and sinonasal debridement    Anesthesia: Topical anesthetic [place]     Surgeon Paul Mendez MD    EBL: 0cc    Procedure Detail & Findings:     After placement of topical anesthetic intranasally the endoscope was inserted into each nares and driven through the nasal cavity into the nasopharynx. The following findings were noted:    Septum: Midline   Inferior turbinates: Normal  Middle meatus: Patent no purulence. Middle turbinates: Normal  Purulence: None noted  Polyps: None noted  Nasopharynx and eustachian tube: No masses  Other: Crusting and debris debrided from bilateral sinonasal cavities with instrumentation and suction    Condition: Stable    Complications: Patient tolerated the procedure well with no immediate complication. Troy Rosales MD        Current Outpatient Medications   Medication Sig Dispense Refill    buPROPion  MG Oral Tablet 24 Hr Take 1 tablet (150 mg total) by mouth daily. cholecalciferol 50 MCG (2000 UT) Oral Tab Take 1 tablet (2,000 Units total) by mouth daily. azelastine 0.1 % Nasal Solution 2 sprays by Nasal route 2 (two) times daily. 90 mL 1    montelukast 10 MG Oral Tab Take 1 tablet (10 mg total) by mouth every evening. 90 tablet 1    QUEtiapine  MG Oral Tablet 24 Hr Take 1 tablet (400 mg total) by mouth nightly. atomoxetine 10 MG Oral Cap Take 10 mg in the morning      MONTELUKAST 10 MG Oral Tab TAKE 1 TABLET(10 MG) BY MOUTH EVERY NIGHT 90 tablet 0    SUMAtriptan Succinate 100 MG Oral Tab 1 tablet (100 mg total).       verapamil  MG Oral Tab CR Take 1 tablet (180 mg total) by mouth nightly. Fexofenadine HCl 180 MG Oral Tab Take 1 tablet (180 mg total) by mouth daily. (Patient taking differently: Take 1 tablet (180 mg total) by mouth nightly.) 90 tablet 0    Multiple Vitamins-Minerals (TAB-A-SHAYLA MAXIMUM) Oral Tab Take 1 tablet by mouth daily. albuterol sulfate (2.5 MG/3ML) 0.083% Inhalation Nebu Soln Take 3 mL (2.5 mg total) by nebulization every 4 (four) hours as needed for Wheezing. 30 ampule 0    Budesonide-Formoterol Fumarate 80-4.5 MCG/ACT Inhalation Aerosol Inhale 2 puffs into the lungs 2 (two) times daily. 1 each 0    Budesonide-Formoterol Fumarate 80-4.5 MCG/ACT Inhalation Aerosol Inhale 2 puffs into the lungs 2 (two) times daily. 3 each 0    buPROPion 75 MG Oral Tab Take 1 tablet (75 mg total) by mouth 2 (two) times daily. Patient takes different 150 mg daily      QUEtiapine ER 50 MG Oral Tablet 24 Hr       Tiotropium Bromide Monohydrate (SPIRIVA RESPIMAT) 1.25 MCG/ACT Inhalation Aero Soln Inhale 2 sprays into the lungs daily. (Patient not taking: Reported on 11/3/2022) 3 Inhaler 2    Vitamin C 500 MG Oral Tab Take 500 mg by mouth daily. (Patient not taking: Reported on 11/3/2022)      DiphenhydrAMINE HCl 25 MG Oral Tab Take 1 tablet (25 mg total) by mouth every 6 (six) hours as needed for Itching or Allergies. (Patient not taking: Reported on 8/14/2023)      ferrous sulfate 325 (65 FE) MG Oral Tab EC Take 1 tablet (325 mg total) by mouth daily with breakfast. (Patient not taking: Reported on 8/14/2023)      ClonazePAM 0.5 MG Oral Tab Take 2 tablets (1 mg total) by mouth nightly as needed. (Patient not taking: Reported on 8/14/2023)      omega-3 fatty acids 1000 MG Oral Cap Take 1,000 mg by mouth daily. (Patient not taking: Reported on 11/3/2022)      QUEtiapine Fumarate (SEROQUEL) 200 MG Oral Tab Take 300 mg by mouth nightly.    (Patient not taking: Reported on 11/3/2022)        Past Medical History:   Diagnosis Date    Anxiety Arrhythmia     Attention deficit hyperactivity disorder (ADHD)     CERVICAL DYSPLASIA 07/2011    Depression     Hearing impairment     auditory neuropathy    Heart murmur     Hemorrhoid     Hx of motion sickness     IBS (irritable bowel syndrome)     Migraines     Other and unspecified hyperlipidemia     Parotid swelling     Recurrent sinusitis     Seasonal allergies     Visual impairment       Social History:  Social History     Socioeconomic History    Marital status:     Number of children: 0   Occupational History    Occupation:    Tobacco Use    Smoking status: Never    Smokeless tobacco: Never   Vaping Use    Vaping Use: Never used   Substance and Sexual Activity    Alcohol use: Yes     Comment: socially    Drug use: No    Sexual activity: Not Currently     Partners: Female     Birth control/protection: OCP   Social History Narrative    . Works as a . Social alcohol use. Non-smoker. No structured exercise. No children.   Has life partner          Bayron Wilkerson MD  10/10/2023  12:32 PM

## 2023-12-26 RX ORDER — MONTELUKAST SODIUM 10 MG/1
10 TABLET ORAL EVERY EVENING
Qty: 90 TABLET | Refills: 1 | Status: SHIPPED | OUTPATIENT
Start: 2023-12-26

## 2023-12-26 NOTE — TELEPHONE ENCOUNTER
Refill requested for Bailey Santana  Allergy Clinical Staff  Phone Number: [phone]     Refills have been requested for the following medications:        montelukast 10 MG Oral Tab [Doroteo Thorpe      Patient Comment: Is it possible to have refills for a year? Preferred pharmacy: [facility] [address] [place] [address] [phone], [phone]    Last office visit: 08/14/2023    Previously advised to follow up in no follow up timeline listed. According to office visit note, she is on singulair for allergies, not specifically for asthma. No other daily asthma control medications. One year follow up    F/U currently scheduled? Not at this time       ACTION: Refilled per protocol.

## 2024-04-01 NOTE — ED PROVIDER NOTES
Patient Seen in: [facility]    History   Patient presents with:  Cough/URI    Stated Complaint: sob, cough    HPI    Beula Parents is a 59-year-old female who presents today for evaluation of dyspnea and cough.   She has a past medical h stated complaint: sob, cough  Other systems are as noted in HPI. Constitutional and vital signs reviewed. All other systems reviewed and negative except as noted above.     Physical Exam   ED Triage Vitals [06/09/18 1344]  BP: 118/77  Pulse: 88  Resp: and inhaler more frequently. Dry cough for 3 days. Denies fever. States \"lungs tired\"  Peak flow this morning is only up to               150.                          Order Specific Question: What is the Relevant Clinical Indication / Ashli Sam physician or her allergist for reevaluation and possible reformulation of her medications. She oxygenate above 95% on room air today. Her exam improves after DuoNeb in the office today and subjectively she is feeling better.   The patient is encouraged to Note Text (......Xxx Chief Complaint.): This diagnosis correlates with the Other (Free Text): Elizabeth Glow Facial\\n\\nPE:\\n\\nPrior to treatment, all but not limited to treatment indications and expectations(including management of any possible irritations) protocols, risks and benefits were reviewed in detail, including post treatment expectations. All questions were answered prior to administering the treatment.\\n\\nTreatment #\\nTreatment Site:\\n\\nCleanse:\\n\\nSelected Serum:\\nLot#:\\nExp:\\nGrit Size:\\nPSI:\\n\\nHydrotant Marine Mask was gently massaged on and occluded with a warm schmidt towel. Mask was removed within a few minutes or as tolerated.\\n\\nProducts applied:\\n\\nNotes:\\n\\Ori indications, treatment expectations (including management of any possible irritations), protocols, risks and benefits, pre/post care are reviewed. Additional information can be found on consent documentation. Patient understands that multiple treatments may be necessary for optimum results and that on going maintenance with at-home products and additional office visits or treatments may be needed to enhance and extend the desired results.\\n\\nStandard protocol was done. Following treatment, the expected mild erythema was observed. Patient tolerated the procedure well without immediate complication. Post care was reviewed and a follow up appointment was recommended.\\n\\Ori questions were addressed.\\n\\nRTC: Render Risk Assessment In Note?: no Detail Level: Zone

## (undated) DEVICE — INSTRUMENT TRACKER 9733533XOM ENT 1PK

## (undated) DEVICE — WIPE INST 3.625X3IN MRCL MRCL

## (undated) DEVICE — BLADE SHVR 11CM 2MM XPS 360D

## (undated) DEVICE — SUT PLAIN GUT 4-0 SC-1 1828H

## (undated) DEVICE — PATIENT TRACKER 9734887XOM NON-INVASIVE

## (undated) DEVICE — Device

## (undated) DEVICE — MEDI-VAC NON-CONDUCTIVE SUCTION TUBING: Brand: CARDINAL HEALTH

## (undated) DEVICE — BALLOON SEEKR 1830617FRT70 70DG FRT 6X17: Brand: NUVENT ™

## (undated) DEVICE — SUT PLAIN GUT 5-0 PC-1 1915G

## (undated) DEVICE — BLADE 1884380EM QUADCUT 4.3MMX13CM ROHS: Brand: ROTATABLE FUSION®

## (undated) DEVICE — Device: Brand: HEMOPORE

## (undated) DEVICE — NASAL ACCESSORY: Brand: MEDLINE INDUSTRIES, INC.

## (undated) DEVICE — PAD,EYE,LARGE,2 1/8"X2 5/8",STERILE,LF: Brand: MEDLINE

## (undated) DEVICE — SOL NACL IRRIG 0.9% 1000ML BTL

## (undated) DEVICE — SUT CHROMIC GUT 4-0 P-3 1654G

## (undated) DEVICE — SUCTION CANISTER, 3000CC,SAFELINER: Brand: DEROYAL

## (undated) DEVICE — HEAD & NECK: Brand: MEDLINE INDUSTRIES, INC.

## (undated) DEVICE — SHEATH 1912040 5PK ES2 STRYKER 4MM/0DEG: Brand: ENDO-SCRUB®

## (undated) DEVICE — GAMMEX® PI HYBRID SIZE 7.5, STERILE POWDER-FREE SURGICAL GLOVE, POLYISOPRENE AND NEOPRENE BLEND: Brand: GAMMEX

## (undated) DEVICE — INFLATOR KIT 18INFKIT BALLOON: Brand: NUVENT™

## (undated) NOTE — LETTER
[facility] CARE IN [place]  [address] [address]  Dept: [phone]  Dept Fax: [phone]         December 28, 2017    Patient: Vu Bryant   YOB: 1980   Date of Visit: 12/28/2017       To Whom It May

## (undated) NOTE — LETTER
[facility] CARE IN [place]  [address] [address]  Dept: [phone]  Dept Fax: [phone]         December 4, 2017    Patient: Pamella Terry   YOB: 1980   Date of Visit: 12/4/2017       To Whom It May C

## (undated) NOTE — LETTER
[facility] IMMEDIATE CARE IN [address]  Dept: [phone]  Dept Fax: [phone]         June 30, 2018    Patient: Becca Perez   YOB: 1980   Date of Visit: 6/30/2018       To Whom It May Conc

## (undated) NOTE — LETTER
Date & Time: 11/3/2022, 9:25 AM  Patient: Dani Bright [facility]  Encounter Provider(s):    ALEAH Jacobson       To Whom It May Concern:    Andreina Birmingham was seen and treated in our department on 11/3/2022. She should not return to work until 11/06/2022. If you have any questions or concerns, please do not hesitate to call.       EDILBERTO Lewis  _____________________________  [id number]/CST Signature

## (undated) NOTE — LETTER
February 24, 2018    Patient: Citlalli Gomez   Date of Visit: 2/24/2018       To Whom It May Concern:    Carter Vogel was seen and treated in our emergency department on 2/24/2018. She can return to work Monday, March 26,2018.     If you have an

## (undated) NOTE — LETTER
9/26/2023              Joanne Home [address] [facility] [address]         To Whom it may concern: This is to certify that Davied Najjar had an appointment on 9/26/2023 at 9:50 AM with Vera rCane MD.    No bending or lifting over 20 pounds for 1 week.     Sincerely,    Vera Crane MD  [facility], [facility]  [address]  [phone]

## (undated) NOTE — LETTER
Date & Time: 7/21/2018, 4:21 PM  Patient: Eden Gimenez  Encounter Provider(s):    Ron Zacarias MD       To Whom It May Concern:    Romeo Leyva was seen and treated in our department on 7/21/2018.  She cleared to return to work on Tuesday Jul

## (undated) NOTE — LETTER
Date & Time: 4/8/2018, 4:45 PM  Patient: Sabrina Beltran  Attending Provider:    Sincerely,    DEV Palomares         To Whom It May Concern:    Tacho Frederick was seen and treated in our department on 4/8/2018.  She should not return to wor

## (undated) NOTE — LETTER
To whom it may concern,    David Jackson was hospitalized at [facility] 7/3/18-7/6/18  Ok to return to work on 7/9/18    Sincerely,    Margot Parker MD

## (undated) NOTE — LETTER
Date & Time: 6/9/2018, 3:14 PM  Patient: Mireya Sena  Encounter Provider(s):    Jennifer Deutsch       To Whom It May Concern:    Paco Rodriguez was seen and treated in our department on 6/9/2018. She should not return to work until 6/13/18.

## (undated) NOTE — ED AVS SNAPSHOT
Mariajose Baljeet   MRN: [id number]    Department:  [facility] Emergency Department   Date of Visit:  7/21/2018           Disclosure     Insurance plans vary and the physician(s) referred by the ER may not be covered by your plan.  Please cont tell this physician (or your personal doctor if your instructions are to return to your personal doctor) about any new or lasting problems. The primary care or specialist physician will see patients referred from the [facility] Emergency Department.  Arthor Bernheim

## (undated) NOTE — LETTER
[facility] CARE IN [place]  [address] [address]  Dept: [phone]  Dept Fax: [phone]      December 31, 2017    Patient: Citlalli Gomez   Date of Visit: 12/31/2017       To Whom It May Concern:    Carter Vogel was s

## (undated) NOTE — LETTER
Date & Time: 1/1/2023, 10:38 AM  Patient: Hanane Trujillo  Encounter Provider(s):    ALEAH Vega       To Whom It May Concern:    Kuldeep Todd was seen and treated in our department on 1/1/2023. She should be excused from work through 1/2/2023.     If you have any questions or concerns, please do not hesitate to call.        _____________________________  Physician/APC Signature

## (undated) NOTE — ED AVS SNAPSHOT
Sabrina Beltran   MRN: [id number]    Department:  [facility] Emergency Department   Date of Visit:  2/24/2018           Disclosure     Insurance plans vary and the physician(s) referred by the ER may not be covered by your plan.  Please cont tell this physician (or your personal doctor if your instructions are to return to your personal doctor) about any new or lasting problems. The primary care or specialist physician will see patients referred from the [facility] Emergency Department.  Marla Garcia

## (undated) NOTE — LETTER
Date & Time: 1/6/2023, 1:43 PM  Patient: Piedad Covert  Encounter Provider(s):    ALEAH Reeves       To Whom It May Concern:    Nellie Elder was seen and treated in our department on 1/6/2023. She should not return to work until Monday, January 9, 2023.     If you have any questions or concerns, please do not hesitate to call.        _____________________________  Physician/APC Signature

## (undated) NOTE — LETTER
07/06/18    Porter Harrington      To Whom It May Concern:     This letter has been written at the patient's request. The above patient was seen at [facility] for treatment of a medical condition from 7/3/18 to 7/6/18    The patient may return to